# Patient Record
Sex: FEMALE | Race: WHITE | Employment: UNEMPLOYED | URBAN - METROPOLITAN AREA
[De-identification: names, ages, dates, MRNs, and addresses within clinical notes are randomized per-mention and may not be internally consistent; named-entity substitution may affect disease eponyms.]

---

## 2021-03-19 ENCOUNTER — TRANSCRIBE ORDER (OUTPATIENT)
Dept: REGISTRATION | Age: 26
End: 2021-03-19

## 2021-03-19 DIAGNOSIS — Z01.812 PRE-PROCEDURE LAB EXAM: Primary | ICD-10-CM

## 2021-03-24 ENCOUNTER — HOSPITAL ENCOUNTER (OUTPATIENT)
Dept: PREADMISSION TESTING | Age: 26
Discharge: HOME | End: 2021-03-24
Payer: COMMERCIAL

## 2021-03-24 VITALS
HEART RATE: 62 BPM | HEIGHT: 67 IN | BODY MASS INDEX: 21.66 KG/M2 | SYSTOLIC BLOOD PRESSURE: 110 MMHG | DIASTOLIC BLOOD PRESSURE: 70 MMHG | RESPIRATION RATE: 16 BRPM | WEIGHT: 138.01 LBS | TEMPERATURE: 98.1 F | OXYGEN SATURATION: 98 %

## 2021-03-24 LAB
BASOPHILS # BLD: 0.1 K/UL (ref 0–0.1)
BASOPHILS NFR BLD: 1 % (ref 0–1)
DIFFERENTIAL METHOD BLD: NORMAL
EOSINOPHIL # BLD: 0.1 K/UL (ref 0–0.4)
EOSINOPHIL NFR BLD: 2 % (ref 0–7)
ERYTHROCYTE [DISTWIDTH] IN BLOOD BY AUTOMATED COUNT: 11.9 % (ref 11.5–14.5)
HCG UR QL: NEGATIVE
HCT VFR BLD AUTO: 42.1 % (ref 35–47)
HGB BLD-MCNC: 13.6 G/DL (ref 11.5–16)
IMM GRANULOCYTES # BLD AUTO: 0 K/UL (ref 0–0.04)
IMM GRANULOCYTES NFR BLD AUTO: 0 % (ref 0–0.5)
LYMPHOCYTES # BLD: 2.6 K/UL (ref 0.8–3.5)
LYMPHOCYTES NFR BLD: 40 % (ref 12–49)
MCH RBC QN AUTO: 29.8 PG (ref 26–34)
MCHC RBC AUTO-ENTMCNC: 32.3 G/DL (ref 30–36.5)
MCV RBC AUTO: 92.1 FL (ref 80–99)
MONOCYTES # BLD: 0.5 K/UL (ref 0–1)
MONOCYTES NFR BLD: 7 % (ref 5–13)
NEUTS SEG # BLD: 3.3 K/UL (ref 1.8–8)
NEUTS SEG NFR BLD: 50 % (ref 32–75)
NRBC # BLD: 0 K/UL (ref 0–0.01)
NRBC BLD-RTO: 0 PER 100 WBC
PLATELET # BLD AUTO: 353 K/UL (ref 150–400)
PMV BLD AUTO: 10.1 FL (ref 8.9–12.9)
RBC # BLD AUTO: 4.57 M/UL (ref 3.8–5.2)
WBC # BLD AUTO: 6.5 K/UL (ref 3.6–11)

## 2021-03-24 PROCEDURE — 85025 COMPLETE CBC W/AUTO DIFF WBC: CPT

## 2021-03-24 PROCEDURE — 36415 COLL VENOUS BLD VENIPUNCTURE: CPT

## 2021-03-24 PROCEDURE — 81025 URINE PREGNANCY TEST: CPT

## 2021-03-24 RX ORDER — NORETHINDRONE ACETATE AND ETHINYL ESTRADIOL 1MG-20(21)
KIT ORAL
COMMUNITY

## 2021-03-24 RX ORDER — BISMUTH SUBSALICYLATE 262 MG
1 TABLET,CHEWABLE ORAL DAILY
COMMUNITY

## 2021-03-24 NOTE — PERIOP NOTES
PREOP, VERBAL & WRITTEN INSTRUCTIONS GIVEN TO PT. PATIENT GIVEN SURGICAL SITE INFECTION FAQs HANDOUT. PT GIVEN 2 BOTTLES OF CHG SOL WITH VERBAL & WRITTEN INSTRUCTIONS. PT GIVEN OPPORTUNITY TO EXPRESS CONCERNS & ASK QUESTIONS. REQUESTED EKG, NOTES, STRESS TEST FROM DR. PANIAGUA CARDIO AS PT HAS MVP, & HEART MURMUR SINCE AGE 10. SHE CONSULTED DR. Radha Cobos [place] [phone]. DOES NOT SEE CARDIOLOGIST ON REGULAR BASES. PT WAS GIVEN VERBAL 7 WRITTEN INSTRUCTIONS FOR REGISTRATION PROTOCOL DOS, & FOR COVID-19 TESTING.

## 2021-03-28 ENCOUNTER — HOSPITAL ENCOUNTER (OUTPATIENT)
Dept: PREADMISSION TESTING | Age: 26
Discharge: HOME | End: 2021-03-28
Payer: COMMERCIAL

## 2021-03-28 DIAGNOSIS — Z01.812 PRE-PROCEDURE LAB EXAM: ICD-10-CM

## 2021-03-28 PROCEDURE — U0003 INFECTIOUS AGENT DETECTION BY NUCLEIC ACID (DNA OR RNA); SEVERE ACUTE RESPIRATORY SYNDROME CORONAVIRUS 2 (SARS-COV-2) (CORONAVIRUS DISEASE [COVID-19]), AMPLIFIED PROBE TECHNIQUE, MAKING USE OF HIGH THROUGHPUT TECHNOLOGIES AS DESCRIBED BY CMS-2020-01-R: HCPCS

## 2021-03-29 ENCOUNTER — ANESTHESIA EVENT (OUTPATIENT)
Dept: SURGERY | Age: 26
End: 2021-03-29
Payer: COMMERCIAL

## 2021-03-29 LAB — SARS-COV-2, COV2NT: NOT DETECTED

## 2021-04-01 ENCOUNTER — ANESTHESIA (OUTPATIENT)
Dept: SURGERY | Age: 26
End: 2021-04-01
Payer: COMMERCIAL

## 2021-04-01 ENCOUNTER — HOSPITAL ENCOUNTER (OUTPATIENT)
Age: 26
Setting detail: OUTPATIENT SURGERY
Discharge: HOME | End: 2021-04-01
Attending: OBSTETRICS & GYNECOLOGY | Admitting: OBSTETRICS & GYNECOLOGY
Payer: COMMERCIAL

## 2021-04-01 VITALS
WEIGHT: 138 LBS | SYSTOLIC BLOOD PRESSURE: 97 MMHG | HEIGHT: 67 IN | DIASTOLIC BLOOD PRESSURE: 60 MMHG | BODY MASS INDEX: 21.66 KG/M2 | RESPIRATION RATE: 15 BRPM | TEMPERATURE: 98 F | HEART RATE: 61 BPM | OXYGEN SATURATION: 100 %

## 2021-04-01 DIAGNOSIS — G89.18 POSTOPERATIVE PAIN: Primary | ICD-10-CM

## 2021-04-01 LAB — HCG UR QL: NEGATIVE

## 2021-04-01 PROCEDURE — 81025 URINE PREGNANCY TEST: CPT

## 2021-04-01 PROCEDURE — 77030010031 HC SCIS ENDOSC MPLR J&J -C: Performed by: OBSTETRICS & GYNECOLOGY

## 2021-04-01 PROCEDURE — 76210000021 HC REC RM PH II 0.5 TO 1 HR: Performed by: OBSTETRICS & GYNECOLOGY

## 2021-04-01 PROCEDURE — 77030002933 HC SUT MCRYL J&J -A: Performed by: OBSTETRICS & GYNECOLOGY

## 2021-04-01 PROCEDURE — 74011250636 HC RX REV CODE- 250/636: Performed by: NURSE ANESTHETIST, CERTIFIED REGISTERED

## 2021-04-01 PROCEDURE — 76210000006 HC OR PH I REC 0.5 TO 1 HR: Performed by: OBSTETRICS & GYNECOLOGY

## 2021-04-01 PROCEDURE — 2709999900 HC NON-CHARGEABLE SUPPLY: Performed by: OBSTETRICS & GYNECOLOGY

## 2021-04-01 PROCEDURE — 76010000153 HC OR TIME 1.5 TO 2 HR: Performed by: OBSTETRICS & GYNECOLOGY

## 2021-04-01 PROCEDURE — 77030040922 HC BLNKT HYPOTHRM STRY -A

## 2021-04-01 PROCEDURE — 77030020263 HC SOL INJ SOD CL0.9% LFCR 1000ML: Performed by: OBSTETRICS & GYNECOLOGY

## 2021-04-01 PROCEDURE — 74011000250 HC RX REV CODE- 250: Performed by: NURSE ANESTHETIST, CERTIFIED REGISTERED

## 2021-04-01 PROCEDURE — 77030010507 HC ADH SKN DERMBND J&J -B: Performed by: OBSTETRICS & GYNECOLOGY

## 2021-04-01 PROCEDURE — 74011000250 HC RX REV CODE- 250: Performed by: OBSTETRICS & GYNECOLOGY

## 2021-04-01 PROCEDURE — 76060000035 HC ANESTHESIA 2 TO 2.5 HR: Performed by: OBSTETRICS & GYNECOLOGY

## 2021-04-01 PROCEDURE — 77030035029 HC NDL INSUF VERES DISP COVD -B: Performed by: OBSTETRICS & GYNECOLOGY

## 2021-04-01 PROCEDURE — 77030005513 HC CATH URETH FOL11 MDII -B: Performed by: OBSTETRICS & GYNECOLOGY

## 2021-04-01 PROCEDURE — 74011250637 HC RX REV CODE- 250/637: Performed by: ANESTHESIOLOGY

## 2021-04-01 PROCEDURE — 74011250636 HC RX REV CODE- 250/636: Performed by: ANESTHESIOLOGY

## 2021-04-01 PROCEDURE — 77030014090 HC TRCR OPT AMR -B: Performed by: OBSTETRICS & GYNECOLOGY

## 2021-04-01 PROCEDURE — 77030018684: Performed by: OBSTETRICS & GYNECOLOGY

## 2021-04-01 PROCEDURE — 77030008756 HC TU IRR SUC STRY -B: Performed by: OBSTETRICS & GYNECOLOGY

## 2021-04-01 PROCEDURE — 77030008683 HC TU ET CUF COVD -A: Performed by: ANESTHESIOLOGY

## 2021-04-01 PROCEDURE — 88305 TISSUE EXAM BY PATHOLOGIST: CPT

## 2021-04-01 PROCEDURE — 77030013079 HC BLNKT BAIR HGGR 3M -A: Performed by: ANESTHESIOLOGY

## 2021-04-01 RX ORDER — LIDOCAINE HYDROCHLORIDE 10 MG/ML
0.1 INJECTION, SOLUTION EPIDURAL; INFILTRATION; INTRACAUDAL; PERINEURAL AS NEEDED
Status: DISCONTINUED | OUTPATIENT
Start: 2021-04-01 | End: 2021-04-01 | Stop reason: HOSPADM

## 2021-04-01 RX ORDER — FENTANYL CITRATE 50 UG/ML
25 INJECTION, SOLUTION INTRAMUSCULAR; INTRAVENOUS
Status: DISCONTINUED | OUTPATIENT
Start: 2021-04-01 | End: 2021-04-01 | Stop reason: HOSPADM

## 2021-04-01 RX ORDER — ROPIVACAINE HYDROCHLORIDE 5 MG/ML
30 INJECTION, SOLUTION EPIDURAL; INFILTRATION; PERINEURAL AS NEEDED
Status: DISCONTINUED | OUTPATIENT
Start: 2021-04-01 | End: 2021-04-01 | Stop reason: HOSPADM

## 2021-04-01 RX ORDER — OXYCODONE AND ACETAMINOPHEN 5; 325 MG/1; MG/1
1 TABLET ORAL
Qty: 12 TAB | Refills: 0 | Status: SHIPPED | OUTPATIENT
Start: 2021-04-01 | End: 2021-04-04

## 2021-04-01 RX ORDER — FENTANYL CITRATE 50 UG/ML
50 INJECTION, SOLUTION INTRAMUSCULAR; INTRAVENOUS AS NEEDED
Status: DISCONTINUED | OUTPATIENT
Start: 2021-04-01 | End: 2021-04-01 | Stop reason: HOSPADM

## 2021-04-01 RX ORDER — ONDANSETRON 2 MG/ML
4 INJECTION INTRAMUSCULAR; INTRAVENOUS AS NEEDED
Status: DISCONTINUED | OUTPATIENT
Start: 2021-04-01 | End: 2021-04-01 | Stop reason: HOSPADM

## 2021-04-01 RX ORDER — ROCURONIUM BROMIDE 10 MG/ML
INJECTION, SOLUTION INTRAVENOUS AS NEEDED
Status: DISCONTINUED | OUTPATIENT
Start: 2021-04-01 | End: 2021-04-01 | Stop reason: HOSPADM

## 2021-04-01 RX ORDER — KETOROLAC TROMETHAMINE 30 MG/ML
INJECTION, SOLUTION INTRAMUSCULAR; INTRAVENOUS AS NEEDED
Status: DISCONTINUED | OUTPATIENT
Start: 2021-04-01 | End: 2021-04-01 | Stop reason: HOSPADM

## 2021-04-01 RX ORDER — ACETAMINOPHEN 325 MG/1
650 TABLET ORAL ONCE
Status: COMPLETED | OUTPATIENT
Start: 2021-04-01 | End: 2021-04-01

## 2021-04-01 RX ORDER — KETAMINE HYDROCHLORIDE 10 MG/ML
INJECTION, SOLUTION INTRAMUSCULAR; INTRAVENOUS AS NEEDED
Status: DISCONTINUED | OUTPATIENT
Start: 2021-04-01 | End: 2021-04-01 | Stop reason: HOSPADM

## 2021-04-01 RX ORDER — DEXMEDETOMIDINE HYDROCHLORIDE 100 UG/ML
INJECTION, SOLUTION INTRAVENOUS AS NEEDED
Status: DISCONTINUED | OUTPATIENT
Start: 2021-04-01 | End: 2021-04-01 | Stop reason: HOSPADM

## 2021-04-01 RX ORDER — SODIUM CHLORIDE 9 MG/ML
25 INJECTION, SOLUTION INTRAVENOUS CONTINUOUS
Status: DISCONTINUED | OUTPATIENT
Start: 2021-04-01 | End: 2021-04-01 | Stop reason: HOSPADM

## 2021-04-01 RX ORDER — MIDAZOLAM HYDROCHLORIDE 1 MG/ML
0.5 INJECTION, SOLUTION INTRAMUSCULAR; INTRAVENOUS
Status: DISCONTINUED | OUTPATIENT
Start: 2021-04-01 | End: 2021-04-01 | Stop reason: HOSPADM

## 2021-04-01 RX ORDER — PROPOFOL 10 MG/ML
INJECTION, EMULSION INTRAVENOUS AS NEEDED
Status: DISCONTINUED | OUTPATIENT
Start: 2021-04-01 | End: 2021-04-01 | Stop reason: HOSPADM

## 2021-04-01 RX ORDER — IBUPROFEN 600 MG/1
600 TABLET ORAL
Qty: 36 TAB | Refills: 2 | OUTPATIENT
Start: 2021-04-01

## 2021-04-01 RX ORDER — ONDANSETRON 2 MG/ML
INJECTION INTRAMUSCULAR; INTRAVENOUS AS NEEDED
Status: DISCONTINUED | OUTPATIENT
Start: 2021-04-01 | End: 2021-04-01 | Stop reason: HOSPADM

## 2021-04-01 RX ORDER — SODIUM CHLORIDE, SODIUM LACTATE, POTASSIUM CHLORIDE, CALCIUM CHLORIDE 600; 310; 30; 20 MG/100ML; MG/100ML; MG/100ML; MG/100ML
100 INJECTION, SOLUTION INTRAVENOUS CONTINUOUS
Status: DISCONTINUED | OUTPATIENT
Start: 2021-04-01 | End: 2021-04-01 | Stop reason: HOSPADM

## 2021-04-01 RX ORDER — SODIUM CHLORIDE 0.9 % (FLUSH) 0.9 %
5-40 SYRINGE (ML) INJECTION AS NEEDED
Status: DISCONTINUED | OUTPATIENT
Start: 2021-04-01 | End: 2021-04-01 | Stop reason: HOSPADM

## 2021-04-01 RX ORDER — NEOSTIGMINE METHYLSULFATE 1 MG/ML
INJECTION INTRAVENOUS AS NEEDED
Status: DISCONTINUED | OUTPATIENT
Start: 2021-04-01 | End: 2021-04-01 | Stop reason: HOSPADM

## 2021-04-01 RX ORDER — HYDROMORPHONE HYDROCHLORIDE 1 MG/ML
0.2 INJECTION, SOLUTION INTRAMUSCULAR; INTRAVENOUS; SUBCUTANEOUS
Status: DISCONTINUED | OUTPATIENT
Start: 2021-04-01 | End: 2021-04-01 | Stop reason: HOSPADM

## 2021-04-01 RX ORDER — MIDAZOLAM HYDROCHLORIDE 1 MG/ML
1 INJECTION, SOLUTION INTRAMUSCULAR; INTRAVENOUS AS NEEDED
Status: DISCONTINUED | OUTPATIENT
Start: 2021-04-01 | End: 2021-04-01 | Stop reason: HOSPADM

## 2021-04-01 RX ORDER — OXYCODONE HYDROCHLORIDE 5 MG/1
5 TABLET ORAL AS NEEDED
Status: DISCONTINUED | OUTPATIENT
Start: 2021-04-01 | End: 2021-04-01 | Stop reason: HOSPADM

## 2021-04-01 RX ORDER — LIDOCAINE HYDROCHLORIDE 20 MG/ML
INJECTION, SOLUTION EPIDURAL; INFILTRATION; INTRACAUDAL; PERINEURAL AS NEEDED
Status: DISCONTINUED | OUTPATIENT
Start: 2021-04-01 | End: 2021-04-01 | Stop reason: HOSPADM

## 2021-04-01 RX ORDER — DIPHENHYDRAMINE HYDROCHLORIDE 50 MG/ML
12.5 INJECTION, SOLUTION INTRAMUSCULAR; INTRAVENOUS AS NEEDED
Status: DISCONTINUED | OUTPATIENT
Start: 2021-04-01 | End: 2021-04-01 | Stop reason: HOSPADM

## 2021-04-01 RX ORDER — SODIUM CHLORIDE 0.9 % (FLUSH) 0.9 %
5-40 SYRINGE (ML) INJECTION EVERY 8 HOURS
Status: DISCONTINUED | OUTPATIENT
Start: 2021-04-01 | End: 2021-04-01 | Stop reason: HOSPADM

## 2021-04-01 RX ORDER — MORPHINE SULFATE 2 MG/ML
2 INJECTION, SOLUTION INTRAMUSCULAR; INTRAVENOUS
Status: DISCONTINUED | OUTPATIENT
Start: 2021-04-01 | End: 2021-04-01 | Stop reason: HOSPADM

## 2021-04-01 RX ORDER — GLYCOPYRROLATE 0.2 MG/ML
INJECTION INTRAMUSCULAR; INTRAVENOUS AS NEEDED
Status: DISCONTINUED | OUTPATIENT
Start: 2021-04-01 | End: 2021-04-01 | Stop reason: HOSPADM

## 2021-04-01 RX ORDER — FENTANYL CITRATE 50 UG/ML
INJECTION, SOLUTION INTRAMUSCULAR; INTRAVENOUS AS NEEDED
Status: DISCONTINUED | OUTPATIENT
Start: 2021-04-01 | End: 2021-04-01 | Stop reason: HOSPADM

## 2021-04-01 RX ORDER — BUPIVACAINE HYDROCHLORIDE 5 MG/ML
INJECTION, SOLUTION EPIDURAL; INTRACAUDAL AS NEEDED
Status: DISCONTINUED | OUTPATIENT
Start: 2021-04-01 | End: 2021-04-01 | Stop reason: HOSPADM

## 2021-04-01 RX ORDER — DEXAMETHASONE SODIUM PHOSPHATE 4 MG/ML
INJECTION, SOLUTION INTRA-ARTICULAR; INTRALESIONAL; INTRAMUSCULAR; INTRAVENOUS; SOFT TISSUE AS NEEDED
Status: DISCONTINUED | OUTPATIENT
Start: 2021-04-01 | End: 2021-04-01 | Stop reason: HOSPADM

## 2021-04-01 RX ORDER — SODIUM CHLORIDE, SODIUM LACTATE, POTASSIUM CHLORIDE, CALCIUM CHLORIDE 600; 310; 30; 20 MG/100ML; MG/100ML; MG/100ML; MG/100ML
25 INJECTION, SOLUTION INTRAVENOUS CONTINUOUS
Status: DISCONTINUED | OUTPATIENT
Start: 2021-04-01 | End: 2021-04-01 | Stop reason: HOSPADM

## 2021-04-01 RX ADMIN — ONDANSETRON HYDROCHLORIDE 4 MG: 2 INJECTION, SOLUTION INTRAMUSCULAR; INTRAVENOUS at 11:50

## 2021-04-01 RX ADMIN — LIDOCAINE HYDROCHLORIDE 40 MG: 20 INJECTION, SOLUTION EPIDURAL; INFILTRATION; INTRACAUDAL; PERINEURAL at 11:45

## 2021-04-01 RX ADMIN — KETAMINE HYDROCHLORIDE 30 MG: 10 INJECTION, SOLUTION INTRAMUSCULAR; INTRAVENOUS at 11:45

## 2021-04-01 RX ADMIN — GLYCOPYRROLATE 0.4 MG: 0.2 INJECTION, SOLUTION INTRAMUSCULAR; INTRAVENOUS at 13:07

## 2021-04-01 RX ADMIN — ROCURONIUM BROMIDE 30 MG: 10 SOLUTION INTRAVENOUS at 11:45

## 2021-04-01 RX ADMIN — NEOSTIGMINE METHYLSULFATE 3 MG: 1 INJECTION, SOLUTION INTRAVENOUS at 13:07

## 2021-04-01 RX ADMIN — ACETAMINOPHEN 650 MG: 325 TABLET ORAL at 09:51

## 2021-04-01 RX ADMIN — FENTANYL CITRATE 50 MCG: 50 INJECTION, SOLUTION INTRAMUSCULAR; INTRAVENOUS at 11:49

## 2021-04-01 RX ADMIN — PROPOFOL 200 MG: 10 INJECTION, EMULSION INTRAVENOUS at 11:45

## 2021-04-01 RX ADMIN — KETOROLAC TROMETHAMINE 30 MG: 30 INJECTION, SOLUTION INTRAMUSCULAR; INTRAVENOUS at 13:10

## 2021-04-01 RX ADMIN — DEXAMETHASONE SODIUM PHOSPHATE 4 MG: 4 INJECTION, SOLUTION INTRAMUSCULAR; INTRAVENOUS at 11:50

## 2021-04-01 RX ADMIN — ONDANSETRON HYDROCHLORIDE 4 MG: 2 INJECTION, SOLUTION INTRAMUSCULAR; INTRAVENOUS at 13:04

## 2021-04-01 RX ADMIN — SODIUM CHLORIDE, POTASSIUM CHLORIDE, SODIUM LACTATE AND CALCIUM CHLORIDE 25 ML/HR: 600; 310; 30; 20 INJECTION, SOLUTION INTRAVENOUS at 10:12

## 2021-04-01 RX ADMIN — PHENYLEPHRINE HYDROCHLORIDE 40 MCG: 10 INJECTION INTRAVENOUS at 12:25

## 2021-04-01 RX ADMIN — FENTANYL CITRATE 50 MCG: 50 INJECTION, SOLUTION INTRAMUSCULAR; INTRAVENOUS at 12:07

## 2021-04-01 RX ADMIN — ONDANSETRON 4 MG: 2 INJECTION INTRAMUSCULAR; INTRAVENOUS at 14:29

## 2021-04-01 RX ADMIN — DEXMEDETOMIDINE HYDROCHLORIDE 10 MCG: 100 INJECTION, SOLUTION, CONCENTRATE INTRAVENOUS at 11:49

## 2021-04-01 NOTE — DISCHARGE INSTRUCTIONS
After Care Instructions For Your Laparoscopy      1. You may resume your usual diet once the nausea resolves. Initially, try sips of warm fluids and a bland diet. 2. Avoid heavy lifting or straining. Gradually increase your activity. First try walking and doing light activity around the house. You may resume your normal habits if no significant discomfort or bleeding develops. Most women can return to work within 2-7 days after this procedure. 3. Sexual intercourse can be resumed as soon as desired provided the discomfort following surgery has subsided. 4. You may take showers or tub baths. It is also safe to swim or use hot tubs once you feel up to it. 5. Some lower abdominal cramping typically occurs after this procedure. Tylenol, Motrin or your prescribed pain medication should relieve this discomfort. You should notice steady improvement in the pain over the next day or so. It is also not unusual to experience some discomfort under your ribs or to notice neck or shoulder soreness. This is due to gas used during the surgery to help the physicians see your pelvic organs. The gas is reabsorbed over a 24 hour course. 6. It is not unusual to have vaginal spotting lasting 2-3 days. It is difficult to predict when your next menstrual period will occur as your body has undergone a major stress. Your period should regulate itself within the first 6 weeks post-op. 7. A bruise may appear around the incision and will gradually resolve as it heals. 8. The suture used to close the incision may be visible above the skin. If so, it will be removed at your office visit. However, frequently sutures are placed below the skin and will reabsorb on their own.   There will be no need for removal.     9. Call the office at [phone] to report any of the following problems: Abdominal pain that is increasing in severity, heavy vaginal bleeding, drainage or separation of your incision site, temperature greater than 100.4 or persistent nausea and vomiting. 10. You should be seen in the office following your surgery. Call for an appointment if this has not already been arranged. At this appointment, the findings noted at the time of your procedure will be discussed. 11. You may remove the dressing from your incision after 12 hours. {Medication reconciliation information is now added to the patient's AVS automatically when it is printed. There is no need to use this SmartLink in discharge instructions. Highlight this text and delete it to clear this message}    ______________________________________________________________________    Anesthesia Discharge Instructions    After general anesthesia or intervenous sedation, for 24 hours or while taking prescription Narcotics:  · Limit your activities  · Do not drive or operate hazardous machinery  · If you have not urinated within 8 hours after discharge, please contact your surgeon on call. · Do not make important personal or business decisions  · Do not drink alcoholic beverages    Report the following to your surgeon:  · Excessive pain, swelling, redness or odor of or around the surgical area  · Temperature over 100.5 degrees  · Nausea and vomiting lasting longer than 4 hours or if unable to take medication  · Any signs of decreased circulation or nerve impairment to extremity:  Change in color, persistent numbness, tingling, coldness or increased pain.   · Any questions    **Last dose of Tylenol (650mg) given at 09:51am**

## 2021-04-01 NOTE — H&P
History and Physical    Patient: Annika Fermin MRN: [id number]  SSN: [id number]    YOB: 1995  Age: 27 y.o. Sex: female      Subjective:      Annika Fermin is a 27 y.o. female presents for surgical evaluation of chronic pelvic pain and dyspareunia. Patient has some improvement in symptoms while using continuous oral contraception. Patient denies changes or complaints this am.     Past Medical History:   Diagnosis Date    Arrhythmia     HEART MURMUR GENETIC, DIAGNOSED WHEN PT WAS 10 YRS OLD. MITRAL VALVE PROLAPSE. RECENTLY SAW DR. Pedro Liao    Chronic pain 2021    LOWER ABDOMINAL PAIN. Past Surgical History:   Procedure Laterality Date    HX HEENT      TONSILLECTOMY-AGE 10    HX HEENT      EXTRACTION OF WISDOM TEETH X 4      Family History   Problem Relation Age of Onset    Diabetes Mother         GESTATIONAL DIABETES.  Other Father         HEART MURMUR WITH MITRAL VALVE PROLAPSE    Other Paternal Grandmother         HEART MURMUR WITH MITRAL VALVE PROLAPS     Social History     Tobacco Use    Smoking status: Former Smoker     Packs/day: 1.00     Years: 6.00     Pack years: 6.00     Quit date: 3/20/2020     Years since quittin.0    Smokeless tobacco: Never Used   Substance Use Topics    Alcohol use: Yes     Comment: RARELY      Prior to Admission medications    Medication Sig Start Date End Date Taking? Authorizing Provider   norethindrone-ethinyl estradiol (Junel FE 1/20, ,) 1 mg-20 mcg (21)/75 mg (7) tab Take  by mouth nightly. TAKES AT 10 PM    Provider, Historical   multivitamin (ONE A DAY) tablet Take 1 Tab by mouth daily. Provider, Historical        No Known Allergies    Review of Systems:  A comprehensive review of systems was negative except for that written in the History of Present Illness. Objective: There were no vitals filed for this visit.      Physical Exam:  GENERAL: alert, cooperative, no distress, appears stated age  LUNG: clear to auscultation bilaterally  HEART: regular rate and rhythm  ABDOMEN: soft, non-tender. EXTREMITIES:  extremities normal, atraumatic, no cyanosis or edema    No results found for this or any previous visit (from the past 12 hour(s)). Assessment and Plan:     28 yo presents for diagnostic laparoscopy for chronic pelvic pain and dyspareunia.    Consents signed  Risks reviewed including bleeding, infection, open procedure, and surgical injury   Proceed to OR       Signed By: Rose Amaro MD     April 1, 2021

## 2021-04-01 NOTE — OP NOTES
Gynecologic Laparoscopy Procedure Note    Patient ID:     Name: Melissa Beckett    Medical Record Number: [id number]    YOB: 1995    April 1, 2021      Preoperative Diagnosis:   PELVIC PAIN    Postoperative Diagnosis: PELVIC PAIN    Surgeon: Priscilla Ureña MD    Assistant: Dino Loya MD     Anesthesia: General    Procedure: Diagnostic laparoscopy and excision of endometriosis     Estimated Blood Loss: 5ml     Pathology /Specimens: Left ovarian fossa biopsy , right anterior broad ligament biopsy , anterior peritoneum biopsy     Complications: none     Findings: multiple areas of endometriosis implants including the Left ovarian fossa, left pelvic side wall atop uterer, right pelvic side wall along uterer. Cul de sac atop rectum , Left uterine artery, and right anterior broad ligament ,left fimbria and ovary. No adhesions were noted. Staging consistent with stage II. Uterus, fallopian tubes, and ovaries otherwise normal in appearance      Signed By: Priscilla Ureña MD      Procedure in Detail:  The patient was taken to the operating room where she was placed in the supine position. After undergoing adequate general endotracheal anesthesia, she was placed up in the [facility] laparoscopy stirru in the dorsal lithotomy position. The patient was then prepped and draped in the usual fashion and howell catheter was placed into the bladder. Attention was first turned to the vagina where the speculum was placed in the vagina. The anterior lip of the cervix was grasped with a single-toothed tenaculum. Hulka tenaculum was placed without difficulty. All other instruments were removed from the vagina and 's gloves were changed. Attention was then turned to the abdomen. A vertical incision was made in the umbilicus. The Veress needle was introduced but given saline test did not insuflate. The decision made to enter under direct visualization.   5mm  trocar was place at the umbilical port and Z21 pnuemo-peritoneum was created without difficulty. There was no evidence of bowel injury nor bleeding. 3 accessory ports was placed, two  in the right and one  left  lower quadrants in the same fashion under direct visualization and without apparent injury. The above findings were noted. The following was performed. The area of endometriosis along the left ovarian fossa was elevated and resected using endoshears. The area along the right aterior broad ligament was elevated and resected using endoshears. The anterior peritoneal biopsy implant was elevated and biopsies. Given the location of the further implants the decision was made to not resect them. All instruments were removed and CO2 gas was diffused. There was good hemostasis. Skin incisions were closed with 4.0 Vicryl in a subcuticular stitch  on the skin. All instruments were removed from the vagina. The patient was awakened, extubated and taken to the recovery room in good condition.     Signed By: Elizabeth Hatfield MD

## 2021-04-01 NOTE — ANESTHESIA PREPROCEDURE EVALUATION
Relevant Problems   No relevant active problems       Anesthetic History   No history of anesthetic complications            Review of Systems / Medical History  Patient summary reviewed, nursing notes reviewed and pertinent labs reviewed    Pulmonary  Within defined limits                 Neuro/Psych   Within defined limits           Cardiovascular            Dysrhythmias : PVC      Exercise tolerance: >4 METS  Comments: Mitral valve prolapse   GI/Hepatic/Renal  Within defined limits              Endo/Other  Within defined limits           Other Findings   Comments: Recovering drug addict- no use for years           Physical Exam    Airway  Mallampati: II  TM Distance: 4 - 6 cm  Neck ROM: normal range of motion   Mouth opening: Normal     Cardiovascular  Regular rate and rhythm,  S1 and S2 normal,  no murmur, click, rub, or gallop             Dental  No notable dental hx       Pulmonary  Breath sounds clear to auscultation               Abdominal  GI exam deferred       Other Findings            Anesthetic Plan    ASA: 2  Anesthesia type: general          Induction: Intravenous  Anesthetic plan and risks discussed with: Patient

## 2021-04-01 NOTE — ROUTINE PROCESS
Patient: Adrian Section MRN: [id number]  SSN: [id number] YOB: 1995  Age: 27 y.o. Sex: female Patient is status post Procedure(s): DIAGNOSTIC LAPAROSCOPY AND EXCISION OF ENDOMETRIOSIS. Surgeon(s) and Role: * James Magana MD - Primary * Guillermina Landeros MD - Assisting Local/Dose/Irrigation: 20Ml Peripheral IV 04/01/21 Left Forearm (Active) Site Assessment Clean, dry, & intact 04/01/21 1009 Phlebitis Assessment 0 04/01/21 1009 Infiltration Assessment 0 04/01/21 1009 Dressing Status Clean, dry, & intact 04/01/21 1009 Dressing Type Tape;Transparent 04/01/21 1009 Hub Color/Line Status Blue; Infusing 04/01/21 1009 Dressing/Packing:  Incision 04/01/21 Abdomen-Dressing/Treatment: Skin glue (04/01/21 1308) Incision 04/01/21 Perineum-Dressing/Treatment: Peripad (04/01/21 1308) Splint/Cast:  ] Other:

## 2021-04-01 NOTE — PERIOP NOTES
I have reviewed discharge instructions with the patient and boyfriend. The patient and boyfriend verbalized understanding.

## 2021-04-02 NOTE — ANESTHESIA POSTPROCEDURE EVALUATION
Post-Anesthesia Evaluation and Assessment    Patient: Annika Fermin MRN: [id number]  SSN: [id number]    YOB: 1995  Age: 27 y.o. Sex: female      I have evaluated the patient and they are stable and ready for discharge from the PACU. Cardiovascular Function/Vital Signs  Visit Vitals  BP 97/60 (BP 1 Location: Right upper arm, BP Patient Position: At rest)   Pulse 61   Temp 36.7 °C (98 °F)   Resp 15   Ht 5' 7\" (1.702 m)   Wt 62.6 kg (138 lb)   SpO2 100%   BMI 21.61 kg/m²       Patient is status post General anesthesia for Procedure(s):  DIAGNOSTIC LAPAROSCOPY AND EXCISION OF ENDOMETRIOSIS. Nausea/Vomiting: None    Postoperative hydration reviewed and adequate. Pain:  Pain Scale 1: Numeric (0 - 10) (04/01/21 1445)  Pain Intensity 1: 0 (04/01/21 1445)   Managed    Neurological Status:   Neuro (WDL): Within Defined Limits (04/01/21 1445)   At baseline    Mental Status, Level of Consciousness: Alert and  oriented to person, place, and time    Pulmonary Status:   O2 Device: Room air (04/01/21 1445)   Adequate oxygenation and airway patent    Complications related to anesthesia: None    Post-anesthesia assessment completed. No concerns    Signed By: Meli Soriano MD     April 2, 2021              Procedure(s):  DIAGNOSTIC LAPAROSCOPY AND EXCISION OF ENDOMETRIOSIS.     general    <BSHSIANPOST>    INITIAL Post-op Vital signs:   Vitals Value Taken Time   BP 97/61 04/01/21 1415   Temp 36.8 °C (98.2 °F) 04/01/21 1337   Pulse 72 04/01/21 1415   Resp 16 04/01/21 1415   SpO2 99 % 04/01/21 1415

## 2021-07-30 RX ORDER — ONDANSETRON 4 MG/1
4 TABLET, ORALLY DISINTEGRATING ORAL
COMMUNITY

## 2021-08-06 ENCOUNTER — ANESTHESIA (OUTPATIENT)
Dept: ENDOSCOPY | Age: 26
End: 2021-08-06
Payer: COMMERCIAL

## 2021-08-06 ENCOUNTER — HOSPITAL ENCOUNTER (OUTPATIENT)
Age: 26
Setting detail: OUTPATIENT SURGERY
Discharge: HOME | End: 2021-08-06
Attending: INTERNAL MEDICINE | Admitting: INTERNAL MEDICINE
Payer: COMMERCIAL

## 2021-08-06 ENCOUNTER — ANESTHESIA EVENT (OUTPATIENT)
Dept: ENDOSCOPY | Age: 26
End: 2021-08-06
Payer: COMMERCIAL

## 2021-08-06 VITALS
RESPIRATION RATE: 18 BRPM | BODY MASS INDEX: 22.04 KG/M2 | TEMPERATURE: 98.8 F | HEIGHT: 67 IN | DIASTOLIC BLOOD PRESSURE: 62 MMHG | WEIGHT: 140.43 LBS | OXYGEN SATURATION: 99 % | SYSTOLIC BLOOD PRESSURE: 116 MMHG | HEART RATE: 79 BPM

## 2021-08-06 LAB — HCG UR QL: NEGATIVE

## 2021-08-06 PROCEDURE — 74011250636 HC RX REV CODE- 250/636: Performed by: NURSE ANESTHETIST, CERTIFIED REGISTERED

## 2021-08-06 PROCEDURE — 88305 TISSUE EXAM BY PATHOLOGIST: CPT

## 2021-08-06 PROCEDURE — 76060000031 HC ANESTHESIA FIRST 0.5 HR: Performed by: INTERNAL MEDICINE

## 2021-08-06 PROCEDURE — 76040000019: Performed by: INTERNAL MEDICINE

## 2021-08-06 PROCEDURE — 81025 URINE PREGNANCY TEST: CPT

## 2021-08-06 PROCEDURE — 74011250636 HC RX REV CODE- 250/636: Performed by: INTERNAL MEDICINE

## 2021-08-06 PROCEDURE — 2709999900 HC NON-CHARGEABLE SUPPLY: Performed by: INTERNAL MEDICINE

## 2021-08-06 PROCEDURE — 74011000250 HC RX REV CODE- 250: Performed by: NURSE ANESTHETIST, CERTIFIED REGISTERED

## 2021-08-06 PROCEDURE — 77030021593 HC FCPS BIOP ENDOSC BSC -A: Performed by: INTERNAL MEDICINE

## 2021-08-06 RX ORDER — MIDAZOLAM HYDROCHLORIDE 1 MG/ML
.25-5 INJECTION, SOLUTION INTRAMUSCULAR; INTRAVENOUS
Status: DISCONTINUED | OUTPATIENT
Start: 2021-08-06 | End: 2021-08-06 | Stop reason: HOSPADM

## 2021-08-06 RX ORDER — SODIUM CHLORIDE 9 MG/ML
50 INJECTION, SOLUTION INTRAVENOUS CONTINUOUS
Status: DISCONTINUED | OUTPATIENT
Start: 2021-08-06 | End: 2021-08-06 | Stop reason: HOSPADM

## 2021-08-06 RX ORDER — NALOXONE HYDROCHLORIDE 0.4 MG/ML
0.4 INJECTION, SOLUTION INTRAMUSCULAR; INTRAVENOUS; SUBCUTANEOUS
Status: DISCONTINUED | OUTPATIENT
Start: 2021-08-06 | End: 2021-08-06 | Stop reason: HOSPADM

## 2021-08-06 RX ORDER — LIDOCAINE HYDROCHLORIDE 20 MG/ML
INJECTION, SOLUTION EPIDURAL; INFILTRATION; INTRACAUDAL; PERINEURAL AS NEEDED
Status: DISCONTINUED | OUTPATIENT
Start: 2021-08-06 | End: 2021-08-06 | Stop reason: HOSPADM

## 2021-08-06 RX ORDER — PANTOPRAZOLE SODIUM 20 MG/1
20 TABLET, DELAYED RELEASE ORAL DAILY
Qty: 90 TABLET | Refills: 0 | Status: SHIPPED | OUTPATIENT
Start: 2021-08-06 | End: 2022-06-01 | Stop reason: ALTCHOICE

## 2021-08-06 RX ORDER — DEXTROMETHORPHAN/PSEUDOEPHED 2.5-7.5/.8
1.2 DROPS ORAL
Status: DISCONTINUED | OUTPATIENT
Start: 2021-08-06 | End: 2021-08-06 | Stop reason: HOSPADM

## 2021-08-06 RX ORDER — PROPOFOL 10 MG/ML
INJECTION, EMULSION INTRAVENOUS AS NEEDED
Status: DISCONTINUED | OUTPATIENT
Start: 2021-08-06 | End: 2021-08-06 | Stop reason: HOSPADM

## 2021-08-06 RX ORDER — PANTOPRAZOLE SODIUM 20 MG/1
20 TABLET, DELAYED RELEASE ORAL
COMMUNITY
End: 2022-06-01 | Stop reason: ALTCHOICE

## 2021-08-06 RX ORDER — FENTANYL CITRATE 50 UG/ML
INJECTION, SOLUTION INTRAMUSCULAR; INTRAVENOUS AS NEEDED
Status: DISCONTINUED | OUTPATIENT
Start: 2021-08-06 | End: 2021-08-06 | Stop reason: HOSPADM

## 2021-08-06 RX ORDER — EPINEPHRINE 0.1 MG/ML
1 INJECTION INTRACARDIAC; INTRAVENOUS
Status: DISCONTINUED | OUTPATIENT
Start: 2021-08-06 | End: 2021-08-06 | Stop reason: HOSPADM

## 2021-08-06 RX ORDER — PROPOFOL 10 MG/ML
INJECTION, EMULSION INTRAVENOUS
Status: DISCONTINUED | OUTPATIENT
Start: 2021-08-06 | End: 2021-08-06 | Stop reason: HOSPADM

## 2021-08-06 RX ORDER — ATROPINE SULFATE 0.1 MG/ML
0.4 INJECTION INTRAVENOUS
Status: DISCONTINUED | OUTPATIENT
Start: 2021-08-06 | End: 2021-08-06 | Stop reason: HOSPADM

## 2021-08-06 RX ORDER — FLUMAZENIL 0.1 MG/ML
0.2 INJECTION INTRAVENOUS
Status: DISCONTINUED | OUTPATIENT
Start: 2021-08-06 | End: 2021-08-06 | Stop reason: HOSPADM

## 2021-08-06 RX ORDER — SODIUM CHLORIDE 9 MG/ML
INJECTION, SOLUTION INTRAVENOUS
Status: DISCONTINUED | OUTPATIENT
Start: 2021-08-06 | End: 2021-08-06 | Stop reason: HOSPADM

## 2021-08-06 RX ADMIN — SODIUM CHLORIDE: 9 INJECTION, SOLUTION INTRAVENOUS at 14:15

## 2021-08-06 RX ADMIN — PROPOFOL INJECTABLE EMULSION 30 MG: 10 INJECTION, EMULSION INTRAVENOUS at 14:46

## 2021-08-06 RX ADMIN — FENTANYL CITRATE 50 MCG: 50 INJECTION, SOLUTION INTRAMUSCULAR; INTRAVENOUS at 14:43

## 2021-08-06 RX ADMIN — FENTANYL CITRATE 50 MCG: 50 INJECTION, SOLUTION INTRAMUSCULAR; INTRAVENOUS at 14:40

## 2021-08-06 RX ADMIN — SODIUM CHLORIDE 50 ML/HR: 9 INJECTION, SOLUTION INTRAVENOUS at 13:45

## 2021-08-06 RX ADMIN — LIDOCAINE HYDROCHLORIDE 60 MG: 20 INJECTION, SOLUTION INTRAVENOUS at 14:42

## 2021-08-06 RX ADMIN — PROPOFOL INJECTABLE EMULSION 50 MG: 10 INJECTION, EMULSION INTRAVENOUS at 14:42

## 2021-08-06 RX ADMIN — PROPOFOL INJECTABLE EMULSION 50 MG: 10 INJECTION, EMULSION INTRAVENOUS at 14:43

## 2021-08-06 RX ADMIN — PROPOFOL INJECTABLE EMULSION 20 MG: 10 INJECTION, EMULSION INTRAVENOUS at 14:44

## 2021-08-06 RX ADMIN — PROPOFOL 150 MCG/KG/MIN: 10 INJECTION, EMULSION INTRAVENOUS at 14:42

## 2021-08-06 RX ADMIN — PROPOFOL INJECTABLE EMULSION 50 MG: 10 INJECTION, EMULSION INTRAVENOUS at 14:45

## 2021-08-06 NOTE — ANESTHESIA POSTPROCEDURE EVALUATION
Procedure(s):  ESOPHAGOGASTRODUODENOSCOPY (EGD)  ESOPHAGOGASTRODUODENAL (EGD) BIOPSY. MAC    Anesthesia Post Evaluation      Multimodal analgesia: multimodal analgesia used between 6 hours prior to anesthesia start to PACU discharge  Patient location during evaluation: PACU  Patient participation: complete - patient participated  Level of consciousness: awake and alert  Pain management: adequate  Airway patency: patent  Anesthetic complications: no  Cardiovascular status: acceptable  Respiratory status: acceptable  Hydration status: acceptable  Post anesthesia nausea and vomiting:  none  Final Post Anesthesia Temperature Assessment:  Normothermia (36.0-37.5 degrees C)      INITIAL Post-op Vital signs:   Vitals Value Taken Time   /62 08/06/21 1512   Temp 37.1 °C (98.8 °F) 08/06/21 1452   Pulse 80 08/06/21 1515   Resp 17 08/06/21 1515   SpO2 99 % 08/06/21 1514   Vitals shown include unvalidated device data.

## 2021-08-06 NOTE — PROGRESS NOTES
Jamey Hightower  1995  [id number]    Situation:  Verbal report received from: David Urbina RN  Procedure: Procedure(s):  ESOPHAGOGASTRODUODENOSCOPY (EGD)  ESOPHAGOGASTRODUODENAL (EGD) BIOPSY    Background:    Preoperative diagnosis: Chronic nausea [R11.0]  Postoperative diagnosis: Normal EGD. :  Dr. Kavin Wang  Assistant(s): Endoscopy RN-1: Liz Paget, RN  Endoscopy RN-2: Aldo Fajardo RN    Specimens:   ID Type Source Tests Collected by Time Destination   1 : duodenum biopsy Preservative Duodenum  Rayna Quiroz MD 8/6/2021 1446 Pathology   2 : gastric biopsy Preservative Gastric  Rayna Quiroz MD 8/6/2021 1446 Pathology     H. Pylori  no    Assessment:  Intra-procedure medications   Anesthesia gave intra-procedure sedation and medications, see anesthesia flow sheet yes    Intravenous fluids: NS@ KVO     Vital signs stable yes    Abdominal assessment: round and soft yes    Recommendation:  Discharge patient per MD order yes.   Family or Friend daniel Dillon  Permission to share finding with family or friend yes

## 2021-08-06 NOTE — DISCHARGE INSTRUCTIONS
Chepe Gan M.D.  [phone]           2021  Deion Dale  :  1995  [facility] Medical Record Number:  [id number]        ENDOSCOPY FINDINGS:   Your endoscopy showed normal exam.      EGD DISCHARGE INSTRUCTIONS    DISCOMFORT:  Sore throat- throat lozenges or warm salt water gargle  redness at IV site- apply warm compress to area; if redness or soreness persist- contact your physician  Gaseous discomfort- walking, belching will help relieve any discomfort  You may not operate a vehicle for 12 hours  You may not engage in an occupation involving machinery or appliances for rest of today  You may not drink alcoholic beverages for at least 12 hours  Avoid making any critical decisions for at least 24 hour    DIET:   You may resume your regular diet. ACTIVITY  Spend the remainder of the day resting -  avoid any strenuous activity. Avoid driving or operating machinery. CALL M.D. ANY SIGN OF   Increasing pain, nausea, vomiting  Abdominal distension (swelling)  New increased bleeding (oral or rectal)  Fever (chills)  Pain in chest area  Bloody discharge from nose or mouth  Shortness of breath    Follow-up Instructions:   Call Dr. Toñito Silveira for any questions or problems. Telephone # [phone]  If biopsies were obtained, the results will be available  in  5 to 7 days. We will call you to notify you of these results. Continue same medications. Follow up in the office.

## 2021-08-06 NOTE — PERIOP NOTES
1440  Anesthesia staff at patient's bedside administering anesthesia and monitoring patients vital signs throughout procedure. See anesthesia note. Post procedure, report received from Veronica HONG. 1442  Endoscope was pre-cleaned at bedside immediately following procedure by endo Freda dominguez RN.     2384  Patient tolerated procedure. Abdomen soft and patient arousable and voices no complaints. Patient transported to endoscopy recovery area. Report given to post procedure Darell GUZMAN.

## 2021-08-06 NOTE — PROCEDURES
Angelica Capellan M.D.  [phone]           2021                EGD Operative Report  Samuel Lombardo  :  1995  [address] [place] [address] Medical Record Number:  [id number]      Indication: chronic nausea     : Mishel Jaramillo MD    Assistant -- None  Implants -- None    Referring Provider:  None      Anesthesia/Sedation:  MAC anesthesia Propofol    Airway assessment: No airway problems anticipated    Pre-Procedural Exam:      Airway: clear, no airway problems anticipated  Heart: RRR, without gallops or rubs  Lungs: clear bilaterally without wheezes, crackles, or rhonchi  Abdomen: soft, nontender, nondistended, bowel sounds present  Mental Status: awake, alert and oriented to person, place and time       Procedure Details     After infomed consent was obtained for the procedure, with all risks and benefits of procedure explained the patient was taken to the endoscopy suite and placed in the left lateral decubitus position. Following sequential administration of sedation as per above, the endoscope was inserted into the mouth and advanced under direct vision to second portion of the duodenum. A careful inspection was made as the gastroscope was withdrawn, including a retroflexed view of the proximal stomach; findings and interventions are described below. Findings:   Esophagus:normal  Stomach: normal   Duodenum/jejunum: normal    Therapies:  biopsy of stomach - r/o H.pylori infection  biopsy of duodenal - r/o celiac disease    Specimens: as above           Complications:   None; patient tolerated the procedure well. EBL:  None. Impression:    Normal EGD     Recommendations:    -Continue acid suppression. ,   -Await pathology.     Mishel Jaramillo MD

## 2021-08-06 NOTE — PROGRESS NOTES
Endoscopy discharge instructions have been reviewed and given to patient. The patient verbalized understanding and acceptance of instructions. Dr. Tania Barker discussed with patient and margoancee procedure findings and next steps.

## 2021-08-06 NOTE — H&P
Elias Del Rio M.D.  [phone]            History and Physical       NAME:  Kari Garcias   :   1995   MRN:   [id number]       Referring Physician:  None      Consult Date: 2021 2:31 PM    Chief Complaint: nausea     History of Present Illness:   Patient is a 28 yo female who presents today for evaluation of nausea. Today, she complains of nausea that has been ongoing off and on for several years. Had ex lap w/ excision of endometriosis at [facility] on . Since then, she has had nausea daily. She was taking percocet for four days after procedure. She states her nausea is typically worse in the evening before bed. No vomiting at all. Sometimes eating will make nausea better and sometimes worse. Occasionally acid reflux. She states she had an episode of severe epigastric pain in the past and someone said she may have an ulcer and gave her acid reflux medication - symptoms improved with medication. She states she did nausea with that episode of pain as well. She has been having BMs regularly - occasionally with harder stools but this is not often. She states gynecologist told her she has endometriosis on her colon. Has never had an endoscopy. Was taking ibuprofen often last year but not recently. 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 MITRAL VALVE PROLAPSE. RECENTLY SAW DR. Cheyenne Carlson    CAD (coronary artery disease)     Chronic pain 2021    LOWER ABDOMINAL PAIN.    GERD (gastroesophageal reflux disease)     Ill-defined condition     mitral valve prolapse    Mitral valve prolapse     Nausea & vomiting        PSH:  Past Surgical History:   Procedure Laterality Date    HX GYN      laproscopic surgery for endometriosis    HX HEENT      TONSILLECTOMY-AGE 10    HX HEENT      EXTRACTION OF WISDOM TEETH X 4       Allergies:   Allergies   Allergen Reactions  Chlorhexidine Hives       Home Medications:  Prior to Admission Medications   Prescriptions Last Dose Informant Patient Reported? Taking?   ibuprofen (MOTRIN) 600 mg tablet 2021 at Unknown time  No Yes   Sig: Take 1 Tab by mouth every eight (8) hours as needed for Pain.   multivitamin (ONE A DAY) tablet 8/3/2021  Yes No   Sig: Take 1 Tab by mouth daily. norethindrone-ethinyl estradiol (Junel FE 1/20, ,) 1 mg-20 mcg (21)/75 mg (7) tab 2021 at Unknown time  Yes Yes   Sig: Take  by mouth nightly. TAKES AT 10 PM   ondansetron (ZOFRAN ODT) 4 mg disintegrating tablet 2021 at Unknown time  Yes Yes   Sig: Take 4 mg by mouth every eight (8) hours as needed for Nausea or Vomiting. pantoprazole (PROTONIX) 20 mg tablet 2021 at Unknown time  Yes Yes   Sig: Take 20 mg by mouth Daily (before breakfast). Indications: gastroesophageal reflux disease      Facility-Administered Medications: None       Hospital Medications:  No current facility-administered medications for this encounter. Facility-Administered Medications Ordered in Other Encounters   Medication Dose Route Frequency    0.9% sodium chloride infusion   IntraVENous CONTINUOUS       Social History:  Social History     Tobacco Use    Smoking status: Former Smoker     Packs/day: 1.00     Years: 6.00     Pack years: 6.00     Quit date: 3/20/2020     Years since quittin.3    Smokeless tobacco: Never Used   Substance Use Topics    Alcohol use: Yes     Comment: RARELY       Family History:  Family History   Problem Relation Age of Onset    Diabetes Mother         GESTATIONAL DIABETES.     Other Father         HEART MURMUR WITH MITRAL VALVE PROLAPSE    Other Paternal Grandmother         HEART MURMUR WITH MITRAL VALVE PROLAPS             Review of Systems:      Constitutional: negative fever, negative chills, negative weight loss  Eyes:   negative visual changes  ENT:   negative sore throat, tongue or lip swelling  Respiratory:  negative cough, negative dyspnea  Cards:  negative for chest pain, palpitations, lower extremity edema  GI:   See HPI  :  negative for frequency, dysuria  Integument:  negative for rash and pruritus  Heme:  negative for easy bruising and gum/nose bleeding  Musculoskel: negative for myalgias,  back pain and muscle weakness  Neuro: negative for headaches, dizziness, vertigo  Psych:  negative for feelings of anxiety, depression       Objective:     Patient Vitals for the past 8 hrs:   BP Temp Pulse Resp SpO2 Height Weight   08/06/21 1313 122/67 98.2 °F (36.8 °C) 87 17 99 % 5' 7\" (1.702 m) 63.7 kg (140 lb 6.9 oz)     No intake/output data recorded. No intake/output data recorded. EXAM:     NEURO-a&o   HEENT-wnl   LUNGS-clear    COR-regular rate and rhythym     ABD-soft , no tenderness, no rebound, good bs     EXT-no edema     Data Review     No results for input(s): WBC, HGB, HCT, PLT, HGBEXT, HCTEXT, PLTEXT in the last 72 hours. No results for input(s): NA, K, CL, CO2, BUN, CREA, GLU, PHOS, CA in the last 72 hours. No results for input(s): AP, TBIL, TP, ALB, GLOB, GGT, AML, LPSE in the last 72 hours. No lab exists for component: SGOT, GPT, AMYP, HLPSE  No results for input(s): INR, PTP, APTT, INREXT in the last 72 hours. There is no problem list on file for this patient. Assessment:   · nausea   Plan:   · EGD  today.      Signed By: Angeline Lea MD     8/6/2021  2:31 PM

## 2021-08-06 NOTE — ANESTHESIA PREPROCEDURE EVALUATION
Relevant Problems   No relevant active problems       Anesthetic History     PONV          Review of Systems / Medical History  Patient summary reviewed, nursing notes reviewed and pertinent labs reviewed    Pulmonary  Within defined limits                 Neuro/Psych   Within defined limits           Cardiovascular            Dysrhythmias : PVC      Exercise tolerance: >4 METS  Comments: Mitral valve prolapse, no recent cardiac s/s   GI/Hepatic/Renal     GERD           Endo/Other  Within defined limits           Other Findings   Comments: Recovering drug addict- no use for years         Physical Exam    Airway  Mallampati: II  TM Distance: 4 - 6 cm  Neck ROM: normal range of motion   Mouth opening: Normal     Cardiovascular  Regular rate and rhythm,  S1 and S2 normal,  no murmur, click, rub, or gallop             Dental  No notable dental hx       Pulmonary  Breath sounds clear to auscultation               Abdominal  GI exam deferred       Other Findings            Anesthetic Plan    ASA: 2  Anesthesia type: MAC          Induction: Intravenous  Anesthetic plan and risks discussed with: Patient

## 2022-06-01 ENCOUNTER — LAB ONLY (OUTPATIENT)
Dept: FAMILY MEDICINE CLINIC | Age: 27
End: 2022-06-01

## 2022-06-01 ENCOUNTER — OFFICE VISIT (OUTPATIENT)
Dept: FAMILY MEDICINE CLINIC | Age: 27
End: 2022-06-01
Payer: COMMERCIAL

## 2022-06-01 VITALS
BODY MASS INDEX: 22.91 KG/M2 | DIASTOLIC BLOOD PRESSURE: 70 MMHG | WEIGHT: 146 LBS | HEART RATE: 87 BPM | OXYGEN SATURATION: 98 % | HEIGHT: 67 IN | TEMPERATURE: 97.8 F | RESPIRATION RATE: 16 BRPM | SYSTOLIC BLOOD PRESSURE: 112 MMHG

## 2022-06-01 DIAGNOSIS — Z11.1 SCREENING-PULMONARY TB: ICD-10-CM

## 2022-06-01 DIAGNOSIS — Z02.0 SCHOOL PHYSICAL EXAM: Primary | ICD-10-CM

## 2022-06-01 DIAGNOSIS — Z02.0 SCHOOL PHYSICAL EXAM: ICD-10-CM

## 2022-06-01 DIAGNOSIS — Z11.59 SCREENING FOR VIRAL DISEASE: ICD-10-CM

## 2022-06-01 DIAGNOSIS — Z23 ENCOUNTER FOR IMMUNIZATION: ICD-10-CM

## 2022-06-01 PROCEDURE — 99203 OFFICE O/P NEW LOW 30 MIN: CPT | Performed by: NURSE PRACTITIONER

## 2022-06-01 PROCEDURE — 90715 TDAP VACCINE 7 YRS/> IM: CPT | Performed by: NURSE PRACTITIONER

## 2022-06-01 RX ORDER — ESCITALOPRAM OXALATE 20 MG/1
TABLET ORAL
COMMUNITY

## 2022-06-01 RX ORDER — OMEPRAZOLE 20 MG/1
20 CAPSULE, DELAYED RELEASE ORAL DAILY
COMMUNITY

## 2022-06-01 NOTE — PATIENT INSTRUCTIONS
Vaccine Information Statement    Tdap (Tetanus, Diphtheria, Pertussis) Vaccine: What You Need to Know     Many vaccine information statements are available in [place]ic and other languages. See www.immunize.org/vis. Hojas de información sobre vacunas están disponibles en español y en muchos otros idiomas. Visite www.immunize.org/vis. 1. Why get vaccinated? Tdap vaccine can prevent tetanus, diphtheria, and pertussis. Diphtheria and pertussis spread from person to person. Tetanus enters the body through cuts or wounds.  TETANUS (T) causes painful stiffening of the muscles. Tetanus can lead to serious health problems, including being unable to open the mouth, having trouble swallowing and breathing, or death.  DIPHTHERIA (D) can lead to difficulty breathing, heart failure, paralysis, or death.  PERTUSSIS (aP), also known as whooping cough, can cause uncontrollable, violent coughing that makes it hard to breathe, eat, or drink. Pertussis can be extremely serious especially in babies and young children, causing pneumonia, convulsions, brain damage, or death. In teens and adults, it can cause weight loss, loss of bladder control, passing out, and rib fractures from severe coughing. 2. Tdap vaccine     Tdap is only for children 7 years and older, adolescents, and adults. Adolescents should receive a single dose of Tdap, preferably at age 6 or 15 years. Pregnant people should get a dose of Tdap during every pregnancy, preferably during the early part of the third trimester, to help protect the  from pertussis. Infants are most at risk for severe, life-threatening complications from pertussis. Adults who have never received Tdap should get a dose of Tdap.       Also, adults should receive a booster dose of either Tdap or Td (a different vaccine that protects against tetanus and diphtheria but not pertussis) every 10 years, or after 5 years in the case of a severe or dirty wound or burn. Tdap may be given at the same time as other vaccines. 3. Talk with your health care provider    Tell your vaccination provider if the person getting the vaccine:   Has had an allergic reaction after a previous dose of any vaccine that protects against tetanus, diphtheria, or pertussis, or has any severe, life-threatening allergies    Has had a coma, decreased level of consciousness, or prolonged seizures within 7 days after a previous dose of any pertussis vaccine (DTP, DTaP, or Tdap)   Has seizures or another nervous system problem   Has ever had Guillain-Barré Syndrome (also called GBS)   Has had severe pain or swelling after a previous dose of any vaccine that protects against tetanus or diphtheria    In some cases, your health care provider may decide to postpone Tdap vaccination until a future visit. People with minor illnesses, such as a cold, may be vaccinated. People who are moderately or severely ill should usually wait until they recover before getting Tdap vaccine. Your health care provider can give you more information. 4. Risks of a vaccine reaction     Pain, redness, or swelling where the shot was given, mild fever, headache, feeling tired, and nausea, vomiting, diarrhea, or stomachache sometimes happen after Tdap vaccination. People sometimes faint after medical procedures, including vaccination. Tell your provider if you feel dizzy or have vision changes or ringing in the ears. As with any medicine, there is a very remote chance of a vaccine causing a severe allergic reaction, other serious injury, or death. 5. What if there is a serious problem? An allergic reaction could occur after the vaccinated person leaves the clinic.  If you see signs of a severe allergic reaction (hives, swelling of the face and throat, difficulty breathing, a fast heartbeat, dizziness, or weakness), call 9-1-1 and get the person to the nearest hospital.    For other signs that concern you, call your health care provider. Adverse reactions should be reported to the Vaccine Adverse Event Reporting System (VAERS). Your health care provider will usually file this report, or you can do it yourself. Visit the VAERS website at www.vaers. hhs.gov or call 9-315.155.9397. VAERS is only for reporting reactions, and VAERS staff members do not give medical advice. 6. The National Vaccine Injury Compensation Program    The [facility] Vaccine Injury Compensation Program (VICP) is a federal program that was created to compensate people who may have been injured by certain vaccines. Claims regarding alleged injury or death due to vaccination have a time limit for filing, which may be as short as two years. Visit the VICP website at www.[facility].gov/vaccinecompensation or call 3-655.302.4200 to learn about the program and about filing a claim. 7. How can I learn more?  Ask your health care provider.  Call your local or state health department.  Visit the website of the Food and Drug Administration (FDA) for vaccine package inserts and additional information at www.fda.gov/vaccines-blood-biologics/vaccines.  Contact the Centers for Disease Control and Prevention (CDC):  - Call 3-898.670.7631 (1-800-CDC-INFO) or  - Visit CDCs website at www.cdc.gov/vaccines. Vaccine Information Statement   Tdap (Tetanus, Diphtheria, Pertussis) Vaccine  8/6/2021  42 JEM Ceron [id number]-38   Department of Health and Human Services  Centers for Disease Control and Prevention    Office Use Only

## 2022-06-01 NOTE — PROGRESS NOTES
Identified pt with two pt identifiers(name and ). Chief Complaint   Patient presents with    New Patient    Labs     fasting        Health Maintenance Due   Topic    Hepatitis C Screening     Depression Screen     HPV Age 9Y-34Y (1 - 2-dose series)    DTaP/Tdap/Td series (1 - Tdap)    Pap Smear     COVID-19 Vaccine (3 - Booster for Pfizer series)       Wt Readings from Last 3 Encounters:   22 146 lb (66.2 kg)   21 140 lb 6.9 oz (63.7 kg)   21 138 lb (62.6 kg)     Temp Readings from Last 3 Encounters:   22 97.8 °F (36.6 °C) (Temporal)   21 98.8 °F (37.1 °C)   21 98 °F (36.7 °C)     BP Readings from Last 3 Encounters:   22 112/70   21 116/62   21 97/60     Pulse Readings from Last 3 Encounters:   22 87   21 79   21 61         Learning Assessment:  :     No flowsheet data found. Depression Screening:  :     3 most recent PHQ Screens 2022   Little interest or pleasure in doing things Not at all   Feeling down, depressed, irritable, or hopeless Not at all   Total Score PHQ 2 0       Fall Risk Assessment:  :     No flowsheet data found. Abuse Screening:  :     Abuse Screening Questionnaire 2022   Do you ever feel afraid of your partner? N   Are you in a relationship with someone who physically or mentally threatens you? N   Is it safe for you to go home? Y       Coordination of Care Questionnaire:  :     1) Have you been to an emergency room, urgent care clinic since your last visit? no   Hospitalized since your last visit? no             2) Have you seen or consulted any other health care providers outside of [address] [place] [address] since your last visit?  yes Psych and Threapist  (Include any pap smears or colon screenings in this section.)    3) Do you have an Advance Directive on file? has  Are you interested in receiving information about Advance Directives? no    Patient is accompanied by N/A I have received verbal consent from Dhara Potter to discuss any/all medical information while they are present in the room. 4.  For patients aged 39-70: Has the patient had a colonoscopy / FIT/ Cologuard? NA - based on age      If the patient is female:    11. For patients aged 41-77: Has the patient had a mammogram within the past 2 years? NA - based on age or sex      10. For patients aged 21-65: Has the patient had a pap smear?  No

## 2022-06-01 NOTE — PROGRESS NOTES
HISTORY OF PRESENT ILLNESS  Sarbjit Cary is a 24 y.o. female. HPI  Pt presents as a new patient  She was not getting care previously    Psychiatrist- Kellen Rock, seen for depression and manages her medications    She is going to nursing school, and needs physical form completed. Review of Systems   Constitutional: Negative for fever. Physical Exam  Constitutional:       Appearance: Normal appearance. HENT:      Head: Normocephalic and atraumatic. Right Ear: Tympanic membrane normal.      Left Ear: Tympanic membrane normal.      Nose: Nose normal.   Eyes:      Extraocular Movements: Extraocular movements intact. Conjunctiva/sclera: Conjunctivae normal.      Pupils: Pupils are equal, round, and reactive to light. Cardiovascular:      Rate and Rhythm: Normal rate and regular rhythm. Heart sounds: Normal heart sounds. Pulmonary:      Effort: Pulmonary effort is normal.      Breath sounds: Normal breath sounds. Abdominal:      General: Abdomen is flat. Bowel sounds are normal.      Palpations: Abdomen is soft. Musculoskeletal:         General: Normal range of motion. Skin:     General: Skin is warm and dry. Neurological:      Mental Status: She is alert and oriented to person, place, and time. Psychiatric:         Mood and Affect: Mood normal.         Behavior: Behavior normal.         ASSESSMENT and PLAN    ICD-10-CM ICD-9-CM    1. School physical exam  Z02.0 V70.5 MEASLES/MUMPS/RUBELLA IMMUNITY      VZV AB, IGG      HEP B SURFACE AB      QUANTIFERON-TB GOLD PLUS   2. Encounter for immunization  Z23 V03.89 TETANUS, DIPHTHERIA TOXOIDS AND ACELLULAR PERTUSSIS VACCINE (TDAP), IN INDIVIDS. >=7, IM   3. Screening for viral disease  Z11.59 V73.99 MEASLES/MUMPS/RUBELLA IMMUNITY      VZV AB, IGG      HEP B SURFACE AB   4.  Screening-pulmonary TB  Z11.1 V74.1 QUANTIFERON-TB GOLD PLUS     Will notify when labs return    Vaccine and VIS given    Pt informed to return to office with worsening of symptoms, or PRN with any questions or concerns. Pt verbalizes understanding of plan of care and denies further questions or concerns at this time.

## 2022-06-02 ENCOUNTER — TELEPHONE (OUTPATIENT)
Dept: FAMILY MEDICINE CLINIC | Age: 27
End: 2022-06-02

## 2022-06-02 LAB
HBV SURFACE AB SER QL: NONREACTIVE
HBV SURFACE AB SER-ACNC: 7.68 MIU/ML

## 2022-06-02 NOTE — TELEPHONE ENCOUNTER
----- Message from Angie Brunson NP sent at 6/2/2022  7:45 AM EDT -----  Please call patient and let her know that her Hep C surface ab returned non reactive. I would assume school would want her to get Hep B vaccine. She should confirm with them, and can return for this with office visit should that be correct.   Still awaiting other labs, I would tell her to wait for all labs to return to see if she needs other vaccines  Thanks

## 2022-06-03 ENCOUNTER — TELEPHONE (OUTPATIENT)
Dept: FAMILY MEDICINE CLINIC | Age: 27
End: 2022-06-03

## 2022-06-03 LAB
MEV IGG SER IA-ACNC: 62.7 AU/ML
MUV IGG SER IA-ACNC: 70.7 AU/ML
RUBV IGG SERPL IA-ACNC: 3.12 INDEX
VZV IGG SER IA-ACNC: <135 INDEX

## 2022-06-03 NOTE — TELEPHONE ENCOUNTER
----- Message from Rafael Garcia NP sent at 6/3/2022  7:55 AM EDT -----  Please call patient and let her know that her varicella titer also returned negative. Again, should confirm with school what they need at this time.   If she needs more vaccines (I suspect this is what they will want), she can make office appointment for these  Thanks

## 2022-06-03 NOTE — PROGRESS NOTES
Please call patient and let her know that her varicella titer also returned negative. Again, should confirm with school what they need at this time.   If she needs more vaccines (I suspect this is what they will want), she can make office appointment for these  Thanks

## 2022-06-05 LAB
GAMMA INTERFERON BACKGROUND BLD IA-ACNC: 0.02 IU/ML
M TB IFN-G BLD-IMP: NEGATIVE
M TB IFN-G CD4+ BCKGRND COR BLD-ACNC: 0.02 IU/ML
MITOGEN IGNF BLD-ACNC: >10 IU/ML
QUANTIFERON TB2 AG: 0.02 IU/ML
SERVICE CMNT-IMP: NORMAL

## 2022-06-06 ENCOUNTER — TELEPHONE (OUTPATIENT)
Dept: FAMILY MEDICINE CLINIC | Age: 27
End: 2022-06-06

## 2022-06-06 NOTE — TELEPHONE ENCOUNTER
----- Message from Tonia Drew NP sent at 6/6/2022  7:53 AM EDT -----  Please call patient and let her know that her TB returned negative, normal.  Does she want me to complete her forms with current labs or is she coming in for more vaccines?   Thanks

## 2022-06-06 NOTE — PROGRESS NOTES
Please call patient and let her know that her TB returned negative, normal.  Does she want me to complete her forms with current labs or is she coming in for more vaccines?   Thanks

## 2022-06-09 ENCOUNTER — LAB ONLY (OUTPATIENT)
Dept: FAMILY MEDICINE CLINIC | Age: 27
End: 2022-06-09

## 2022-06-09 ENCOUNTER — OFFICE VISIT (OUTPATIENT)
Dept: FAMILY MEDICINE CLINIC | Age: 27
End: 2022-06-09
Payer: COMMERCIAL

## 2022-06-09 VITALS
DIASTOLIC BLOOD PRESSURE: 70 MMHG | TEMPERATURE: 97.8 F | HEIGHT: 67 IN | SYSTOLIC BLOOD PRESSURE: 126 MMHG | WEIGHT: 145 LBS | HEART RATE: 97 BPM | OXYGEN SATURATION: 98 % | RESPIRATION RATE: 16 BRPM | BODY MASS INDEX: 22.76 KG/M2

## 2022-06-09 DIAGNOSIS — R53.83 FATIGUE, UNSPECIFIED TYPE: Primary | ICD-10-CM

## 2022-06-09 DIAGNOSIS — R53.83 FATIGUE, UNSPECIFIED TYPE: ICD-10-CM

## 2022-06-09 DIAGNOSIS — Z23 ENCOUNTER FOR IMMUNIZATION: ICD-10-CM

## 2022-06-09 PROCEDURE — 99213 OFFICE O/P EST LOW 20 MIN: CPT | Performed by: NURSE PRACTITIONER

## 2022-06-09 PROCEDURE — 90746 HEPB VACCINE 3 DOSE ADULT IM: CPT | Performed by: NURSE PRACTITIONER

## 2022-06-09 PROCEDURE — 90716 VAR VACCINE LIVE SUBQ: CPT | Performed by: NURSE PRACTITIONER

## 2022-06-09 NOTE — PATIENT INSTRUCTIONS
Vaccine Information Statement    Hepatitis B Vaccine: What You Need to Know    Many vaccine information statements are available in [place] and other languages. See www.immunize.org/vis. Hojas de información sobre vacunas están disponibles en español y en muchos otros idiomas. Visite www.immunize.org/vis. 1. Why get vaccinated? Hepatitis B vaccine can prevent hepatitis B. Hepatitis B is a liver disease that can cause mild illness lasting a few weeks, or it can lead to a serious, lifelong illness.  Acute hepatitis B infection is a short-term illness that can lead to fever, fatigue, loss of appetite, nausea, vomiting, jaundice (yellow skin or eyes, dark urine, ellie-colored bowel movements), and pain in the muscles, joints, and stomach.  Chronic hepatitis B infection is a long-term illness that occurs when the hepatitis B virus remains in a persons body. Most people who go on to develop chronic hepatitis B do not have symptoms, but it is still very serious and can lead to liver damage (cirrhosis), liver cancer, and death. Chronically infected people can spread hepatitis B virus to others, even if they do not feel or look sick themselves. Hepatitis B is spread when blood, semen, or other body fluid infected with the hepatitis B virus enters the body of a person who is not infected. People can become infected through:  [employer] (if a pregnant person has hepatitis B, their baby can become infected)   Sharing items such as razors or toothbrushes with an infected person   Contact with the blood or open sores of an infected person   Sex with an infected partner   Sharing needles, syringes, or other drug-injection equipment   Exposure to blood from needlesticks or other sharp instruments    Most people who are vaccinated with hepatitis B vaccine are immune for life. 2. Hepatitis B vaccine    Hepatitis B vaccine is usually given as 2, 3, or 4 shots.     Infants should get their first dose of hepatitis B vaccine at birth and will usually complete the series at 1018 months of age. The birth dose of hepatitis B vaccine is an important part of preventing long-term illness in infants and the spread of hepatitis B in the [place]. Children and adolescents younger than 23years of age who have not yet gotten the vaccine should be vaccinated. Adults who were not vaccinated previously and want to be protected against hepatitis B can also get the vaccine. Hepatitis B vaccine is also recommended for the following people:     People whose sex partners have hepatitis B   Sexually active persons who are not in a long-term, monogamous relationship   People seeking evaluation or treatment for a sexually transmitted disease   Victims of sexual assault or abuse   Men who have sexual contact with other men   People who share needles, syringes, or other drug-injection equipment   People who live with someone infected with the hepatitis B virus  [address] care and public safety workers at risk for exposure to blood or body fluids   Residents and staff of facilities for developmentally disabled people  [address] living in long term or correction   Travelers to regions with increased rates of hepatitis B   People with chronic liver disease, kidney disease on dialysis, HIV infection, infection with hepatitis C, or diabetes    Hepatitis B vaccine may be given as a stand-alone vaccine, or as part of a combination vaccine (a type of vaccine that combines more than one vaccine together into one shot). Hepatitis B vaccine may be given at the same time as other vaccines.     3. Talk with your health care provider    Tell your vaccination provider if the person getting the vaccine:   Has had an allergic reaction after a previous dose of hepatitis B vaccine, or has any severe, life-threatening allergies     In some cases, your health care provider may decide to postpone hepatitis B vaccination until a future visit.    Pregnant or breastfeeding people should be vaccinated if they are at risk for getting hepatitis B. Pregnancy or breastfeeding are not reasons to avoid hepatitis B vaccination. People with minor illnesses, such as a cold, may be vaccinated. People who are moderately or severely ill should usually wait until they recover before getting hepatitis B vaccine. Your health care provider can give you more information. 4. Risks of a vaccine reaction     Soreness where the shot is given or fever can happen after hepatitis B vaccination. People sometimes faint after medical procedures, including vaccination. Tell your provider if you feel dizzy or have vision changes or ringing in the ears. As with any medicine, there is a very remote chance of a vaccine causing a severe allergic reaction, other serious injury, or death. 5. What if there is a serious problem? An allergic reaction could occur after the vaccinated person leaves the clinic. If you see signs of a severe allergic reaction (hives, swelling of the face and throat, difficulty breathing, a fast heartbeat, dizziness, or weakness), call 9-1-1 and get the person to the nearest hospital.    For other signs that concern you, call your health care provider. Adverse reactions should be reported to the Vaccine Adverse Event Reporting System (VAERS). Your health care provider will usually file this report, or you can do it yourself. Visit the VAERS website at www.vaers. hhs.gov or call 6-436.932.3570. VAERS is only for reporting reactions, and VAERS staff members do not give medical advice. 6. The National Vaccine Injury Compensation Program    The [facility] Vaccine Injury Compensation Program (VICP) is a federal program that was created to compensate people who may have been injured by certain vaccines. Claims regarding alleged injury or death due to vaccination have a time limit for filing, which may be as short as two years.  Visit the VICP website at www.hrsa.gov/vaccinecompensation or call 8-739.498.2155 to learn about the program and about filing a claim. 7. How can I learn more?  Ask your health care provider.  Call your local or state health department.  Visit the website of the Food and Drug Administration (FDA) for vaccine package inserts and additional information at https://www.reyes.com/.  Contact the Centers for Disease Control and Prevention (CDC):  - Call 1-972.496.4660 (1-800-CDC-INFO) or  - Visit CDCs website at www.cdc.gov/vaccines. Vaccine Information Statement   Hepatitis B Vaccine   10/15/2021  42 JEM Alvarez [id number]   Department of Health and Human Services  Centers for Disease Control and Prevention    Office Use Only      Vaccine Information Statement     Varicella (Chickenpox) Vaccine: What You Need to Know    Many vaccine information statements are available in [place] and other languages. See www.immunize.org/vis. Hojas de información sobre vacunas están disponibles en español y en muchos otros idiomas. Visite www.immunize.org/vis. 1. Why get vaccinated? Varicella vaccine can prevent varicella. Varicella, also called chickenpox, causes an itchy rash that usually lasts about a week. It can also cause fever, tiredness, loss of appetite, and headache. It can lead to skin infections, pneumonia, inflammation of the blood vessels, swelling of the brain and/or spinal cord covering, and infections of the bloodstream, bone, or joints. Some people who get chickenpox get a painful rash called shingles (also known as herpes zoster) years later. Chickenpox is usually mild, but it can be serious in infants under 15months of age, adolescents, adults, pregnant people, and people with a weakened immune system. Some people get so sick that they need to be hospitalized. It doesnt happen often, but people can die from chickenpox.     Most people who are vaccinated with 2 doses of varicella vaccine will be protected for life. 2. Varicella vaccine    Children need 2 doses of varicella vaccine, usually:   First dose: age 15 through 17 months   [facility] Second dose: age 3 through 6 years     Older children, adolescents, and adults also need 2 doses of varicella vaccine if they are not already immune to chickenpox. Varicella vaccine may be given at the same time as other vaccines. Also, a child between 13 months and 15years of age might receive varicella vaccine together with MMR (measles, mumps, and rubella) vaccine in a single shot, known as MMRV. Your health care provider can give you more information. 3. Talk with your health care provider    Tell your vaccination provider if the person getting the vaccine:   Has had an allergic reaction after a previous dose of varicella vaccine, or has any severe, life-threatening allergies   Is pregnant or thinks they might be pregnantpregnant people should not get varicella vaccine   Has a weakened immune system, or has a parent, brother, or sister with a history of hereditary or congenital immune system problems   Is taking salicylates (such as aspirin)   Has recently had a blood transfusion or received other blood products   Has tuberculosis   Has gotten any other vaccines in the past 4 weeks    In some cases, your health care provider may decide to postpone varicella vaccination until a future visit. People with minor illnesses, such as a cold, may be vaccinated. People who are moderately or severely ill should usually wait until they recover before getting varicella vaccine. Your health care provider can give you more information. 4. Risks of a vaccine reaction     Sore arm from the injection, redness or rash where the shot is given, or fever can happen after varicella vaccination.  More serious reactions happen very rarely.  These can include pneumonia, infection of the brain and/or spinal cord covering, or seizures that are often associated with fever.  In people with serious immune system problems, this vaccine may cause an infection that may be life-threatening. People with serious immune system problems should not get varicella vaccine. It is possible for a vaccinated person to develop a rash. If this happens, the varicella vaccine virus could be spread to an unprotected person. Anyone who gets a rash should stay away from infants and people with a weakened immune system until the rash goes away. Talk with your health care provider to learn more. Some people who are vaccinated against chickenpox get shingles (herpes zoster) years later. This is much less common after vaccination than after chickenpox disease. People sometimes faint after medical procedures, including vaccination. Tell your provider if you feel dizzy or have vision changes or ringing in the ears. As with any medicine, there is a very remote chance of a vaccine causing a severe allergic reaction, other serious injury, or death. 5. What if there is a serious problem? An allergic reaction could occur after the vaccinated person leaves the clinic. If you see signs of a severe allergic reaction (hives, swelling of the face and throat, difficulty breathing, a fast heartbeat, dizziness, or weakness), call 9-1-1 and get the person to the nearest hospital.    For other signs that concern you, call your health care provider. Adverse reactions should be reported to the Vaccine Adverse Event Reporting System (VAERS). Your health care provider will usually file this report, or you can do it yourself. Visit the VAERS website at www.vaers. hhs.gov or call 7-720.513.5818. VAERS is only for reporting reactions, and VAERS staff members do not give medical advice.     6. The National Vaccine Injury Compensation Program    The National Vaccine Injury Compensation Program (VICP) is a federal program that was created to compensate people who may have been injured by certain vaccines. Claims   regarding alleged injury or death due to vaccination have a time limit for filing, which may   be as short as two years. Visit the VICP website at www.hrsa.gov/vaccinecompensation or   call [phone] 71 [phone] to learn about the program and about filing a claim. 7. How can I learn more?  Ask your health care provider.  Call your local or state health department.  Visit the website of the Food and Drug Administration (FDA) for vaccine package inserts and additional information at www.fda.gov/vaccines-blood-biologics/vaccines.  Contact the Centers for Disease Control and Prevention (CDC):  - Call 8-831.189.7641 (1-800-CDC-INFO) or  - Visit CDCs website at www.cdc.gov/vaccines. Vaccine Information Statement   Varicella Vaccine   8/6/2021  42 JEM Nava [id number]   Department of Health and Human Services  Centers for Disease Control and Prevention    Office Use Only

## 2022-06-09 NOTE — PROGRESS NOTES
HISTORY OF PRESENT ILLNESS  Kaykay Trivedi is a 24 y.o. female. HPI  Pt presents with \"fatigue and vaccines\"    Pt states that she has noted some fatigue over the past couple of months  This concerns her, as she feels like she could go to sleep soon after waking  She has had trouble sleeping recently, and doesn't know if this is just catching up with her, or if something is wrong? 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 Physical Exam  Constitutional:       Appearance: Normal appearance. Cardiovascular:      Rate and Rhythm: Normal rate and regular rhythm. Heart sounds: Normal heart sounds. Pulmonary:      Effort: Pulmonary effort is normal.      Breath sounds: Normal breath sounds. Neurological:      Mental Status: She is alert. Psychiatric:         Mood and Affect: Mood normal.         Behavior: Behavior normal.         ASSESSMENT and PLAN    ICD-10-CM ICD-9-CM    1. Fatigue, unspecified type  R53.83 780.79 CBC W/O DIFF      METABOLIC PANEL, COMPREHENSIVE      THYROID CASCADE PROFILE   2. Encounter for immunization  Z23 V03.89 HEPATITIS B VACCINE, ADULT DOSAGE, IM      VARICELLA, VARIVAX, (AGE 12 MO+), SC     Educated about getting labs today, will notify when these return    Vaccines and VIS given    Pt informed to return to office with worsening of symptoms, or PRN with any questions or concerns. Pt verbalizes understanding of plan of care and denies further questions or concerns at this time.

## 2022-06-09 NOTE — PROGRESS NOTES
Identified pt with two pt identifiers(name and ). 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        Health Maintenance Due   Topic    Hepatitis C Screening     HPV Age 9Y-34Y (1 - 2-dose series)    Pap Smear     COVID-19 Vaccine (3 - Booster for Pfizer series)       Wt Readings from Last 3 Encounters:   22 145 lb (65.8 kg)   22 146 lb (66.2 kg)   21 140 lb 6.9 oz (63.7 kg)     Temp Readings from Last 3 Encounters:   22 97.8 °F (36.6 °C) (Temporal)   22 97.8 °F (36.6 °C) (Temporal)   21 98.8 °F (37.1 °C)     BP Readings from Last 3 Encounters:   22 126/70   22 112/70   21 116/62     Pulse Readings from Last 3 Encounters:   22 97   22 87   21 79         Learning Assessment:  :     No flowsheet data found. Depression Screening:  :     3 most recent PHQ Screens 2022   Little interest or pleasure in doing things Not at all   Feeling down, depressed, irritable, or hopeless Not at all   Total Score PHQ 2 0       Fall Risk Assessment:  :     No flowsheet data found. Abuse Screening:  :     Abuse Screening Questionnaire 2022   Do you ever feel afraid of your partner? N N   Are you in a relationship with someone who physically or mentally threatens you? N N   Is it safe for you to go home? Y Y       Coordination of Care Questionnaire:  :     1) Have you been to an emergency room, urgent care clinic since your last visit? no   Hospitalized since your last visit? no             2) Have you seen or consulted any other health care providers outside of [address] [place] [address] since your last visit? no  (Include any pap smears or colon screenings in this section.)    3) Do you have an Advance Directive on file?  no  Are you interested in receiving information about Advance Directives? no    Patient is accompanied by N/A I have received verbal consent from [place] to discuss any/all medical information while they are present in the room. 4.  For patients aged 39-70: Has the patient had a colonoscopy / FIT/ Cologuard? NA - based on age      If the patient is female:    11. For patients aged 41-77: Has the patient had a mammogram within the past 2 years? NA - based on age or sex      10. For patients aged 21-65: Has the patient had a pap smear?  No

## 2022-06-10 LAB
ALBUMIN SERPL-MCNC: 3.8 G/DL (ref 3.5–5)
ALBUMIN/GLOB SERPL: 1.3 {RATIO} (ref 1.1–2.2)
ALP SERPL-CCNC: 66 U/L (ref 45–117)
ALT SERPL-CCNC: 20 U/L (ref 12–78)
ANION GAP SERPL CALC-SCNC: 7 MMOL/L (ref 5–15)
AST SERPL-CCNC: 17 U/L (ref 15–37)
BILIRUB SERPL-MCNC: 0.2 MG/DL (ref 0.2–1)
BUN SERPL-MCNC: 16 MG/DL (ref 6–20)
BUN/CREAT SERPL: 20 (ref 12–20)
CALCIUM SERPL-MCNC: 9.3 MG/DL (ref 8.5–10.1)
CHLORIDE SERPL-SCNC: 107 MMOL/L (ref 97–108)
CO2 SERPL-SCNC: 25 MMOL/L (ref 21–32)
CREAT SERPL-MCNC: 0.8 MG/DL (ref 0.55–1.02)
ERYTHROCYTE [DISTWIDTH] IN BLOOD BY AUTOMATED COUNT: 12.4 % (ref 11.5–14.5)
GLOBULIN SER CALC-MCNC: 3 G/DL (ref 2–4)
GLUCOSE SERPL-MCNC: 91 MG/DL (ref 65–100)
HCT VFR BLD AUTO: 42.7 % (ref 35–47)
HGB BLD-MCNC: 13.4 G/DL (ref 11.5–16)
MCH RBC QN AUTO: 29.5 PG (ref 26–34)
MCHC RBC AUTO-ENTMCNC: 31.4 G/DL (ref 30–36.5)
MCV RBC AUTO: 94.1 FL (ref 80–99)
NRBC # BLD: 0 K/UL (ref 0–0.01)
NRBC BLD-RTO: 0 PER 100 WBC
PLATELET # BLD AUTO: 358 K/UL (ref 150–400)
PMV BLD AUTO: 10.5 FL (ref 8.9–12.9)
POTASSIUM SERPL-SCNC: 4.3 MMOL/L (ref 3.5–5.1)
PROT SERPL-MCNC: 6.8 G/DL (ref 6.4–8.2)
RBC # BLD AUTO: 4.54 M/UL (ref 3.8–5.2)
SODIUM SERPL-SCNC: 139 MMOL/L (ref 136–145)
WBC # BLD AUTO: 8.8 K/UL (ref 3.6–11)

## 2022-06-11 LAB — TSH SERPL-ACNC: 0.95 UIU/ML (ref 0.45–4.5)

## 2022-06-13 ENCOUNTER — TELEPHONE (OUTPATIENT)
Dept: FAMILY MEDICINE CLINIC | Age: 27
End: 2022-06-13

## 2022-06-13 NOTE — TELEPHONE ENCOUNTER
----- Message from Abilio Franz NP sent at 6/11/2022  7:16 AM EDT -----  Please call patient and let her know that her labs returned stable  Thanks

## 2022-06-21 ENCOUNTER — TELEPHONE (OUTPATIENT)
Dept: FAMILY MEDICINE CLINIC | Age: 27
End: 2022-06-21

## 2022-06-21 NOTE — TELEPHONE ENCOUNTER
----- Message from Farzaneh Roche sent at 6/21/2022  2:13 PM EDT -----  Subject: Message to Provider    QUESTIONS  Information for Provider? 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 SHE IS NOW NEEDING HER   NEXT SET BUT IS NOT SURE WHEN SHE SHOULD SCHEDULE FOR THEM. SHE NEEDS THE   HEPATITIS B AND VARICELLA VACCINE  ---------------------------------------------------------------------------  --------------  CALL BACK INFO  What is the best way for the office to contact you? OK to leave message on   voicemail  Preferred Call Back Phone Number? [phone]  ---------------------------------------------------------------------------  --------------  SCRIPT ANSWERS  Relationship to Patient?  Self

## 2022-07-13 ENCOUNTER — OFFICE VISIT (OUTPATIENT)
Dept: FAMILY MEDICINE CLINIC | Age: 27
End: 2022-07-13
Payer: COMMERCIAL

## 2022-07-13 VITALS
BODY MASS INDEX: 23.23 KG/M2 | DIASTOLIC BLOOD PRESSURE: 70 MMHG | HEIGHT: 67 IN | RESPIRATION RATE: 16 BRPM | HEART RATE: 81 BPM | WEIGHT: 148 LBS | TEMPERATURE: 97.8 F | SYSTOLIC BLOOD PRESSURE: 122 MMHG | OXYGEN SATURATION: 98 %

## 2022-07-13 DIAGNOSIS — Z23 ENCOUNTER FOR IMMUNIZATION: Primary | ICD-10-CM

## 2022-07-13 PROCEDURE — 90746 HEPB VACCINE 3 DOSE ADULT IM: CPT | Performed by: NURSE PRACTITIONER

## 2022-07-13 PROCEDURE — 90716 VAR VACCINE LIVE SUBQ: CPT | Performed by: NURSE PRACTITIONER

## 2022-07-13 RX ORDER — AMOXICILLIN 875 MG/1
TABLET, FILM COATED ORAL
COMMUNITY
Start: 2022-07-11

## 2022-07-13 RX ORDER — FLUCONAZOLE 150 MG/1
TABLET ORAL
COMMUNITY
Start: 2022-07-11

## 2022-07-13 NOTE — PROGRESS NOTES
Identified pt with two pt identifiers(name and ). Chief Complaint   Patient presents with    Immunization/Injection     for school         Health Maintenance Due   Topic    Hepatitis C Screening     HPV Age 9Y-34Y (1 - 2-dose series)    Pap Smear     COVID-19 Vaccine (3 - Booster for Pfizer series)       Wt Readings from Last 3 Encounters:   22 148 lb (67.1 kg)   22 145 lb (65.8 kg)   22 146 lb (66.2 kg)     Temp Readings from Last 3 Encounters:   22 97.8 °F (36.6 °C) (Temporal)   22 97.8 °F (36.6 °C) (Temporal)   22 97.8 °F (36.6 °C) (Temporal)     BP Readings from Last 3 Encounters:   22 122/70   22 126/70   22 112/70     Pulse Readings from Last 3 Encounters:   22 81   22 97   22 87         Learning Assessment:  :     No flowsheet data found. Depression Screening:  :     3 most recent PHQ Screens 2022   Little interest or pleasure in doing things Not at all   Feeling down, depressed, irritable, or hopeless Not at all   Total Score PHQ 2 0       Fall Risk Assessment:  :     No flowsheet data found. Abuse Screening:  :     Abuse Screening Questionnaire 2022   Do you ever feel afraid of your partner? N N N   Are you in a relationship with someone who physically or mentally threatens you? N N N   Is it safe for you to go home? Y Y Y       Coordination of Care Questionnaire:  :     1) Have you been to an emergency room, urgent care clinic since your last visit? no   Hospitalized since your last visit? no             2) Have you seen or consulted any other health care providers outside of [address] [place] [address] since your last visit? no  (Include any pap smears or colon screenings in this section.)    3) Do you have an Advance Directive on file?  no  Are you interested in receiving information about Advance Directives? no    Patient is accompanied by N/A I have received verbal consent from Shimon Dauley to discuss any/all medical information while they are present in the room. 4.  For patients aged 39-70: Has the patient had a colonoscopy / FIT/ Cologuard? No      If the patient is female:    5. For patients aged 41-77: Has the patient had a mammogram within the past 2 years? NA - based on age or sex      10. For patients aged 21-65: Has the patient had a pap smear?  No

## 2022-07-13 NOTE — PROGRESS NOTES
HISTORY OF PRESENT ILLNESS  Mich Shaffer is a 24 y.o. female. 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 Physical Exam  Constitutional:       Appearance: Normal appearance. Cardiovascular:      Rate and Rhythm: Normal rate and regular rhythm. Heart sounds: Normal heart sounds. Pulmonary:      Effort: Pulmonary effort is normal.      Breath sounds: Normal breath sounds. Neurological:      Mental Status: She is alert. Psychiatric:         Mood and Affect: Mood normal.         Behavior: Behavior normal.         ASSESSMENT and PLAN    ICD-10-CM ICD-9-CM    1. Encounter for immunization  Z23 V03.89 HEPATITIS B VACCINE, ADULT DOSAGE, IM      VARICELLA, VARIVAX, (AGE 12 MO+), SC     Vaccine and VIS given    Pt informed to return to office with worsening of symptoms, or PRN with any questions or concerns. Pt verbalizes understanding of plan of care and denies further questions or concerns at this time.

## 2022-07-13 NOTE — PATIENT INSTRUCTIONS
Vaccine Information Statement    Hepatitis B Vaccine: What You Need to Know    Many vaccine information statements are available in Romansh and other languages. See www.immunize.org/vis. Hojas de información sobre vacunas están disponibles en español y en muchos otros idiomas. Visite www.immunize.org/vis. 1. Why get vaccinated? Hepatitis B vaccine can prevent hepatitis B. Hepatitis B is a liver disease that can cause mild illness lasting a few weeks, or it can lead to a serious, lifelong illness.  Acute hepatitis B infection is a short-term illness that can lead to fever, fatigue, loss of appetite, nausea, vomiting, jaundice (yellow skin or eyes, dark urine, ellie-colored bowel movements), and pain in the muscles, joints, and stomach.  Chronic hepatitis B infection is a long-term illness that occurs when the hepatitis B virus remains in a persons body. Most people who go on to develop chronic hepatitis B do not have symptoms, but it is still very serious and can lead to liver damage (cirrhosis), liver cancer, and death. Chronically infected people can spread hepatitis B virus to others, even if they do not feel or look sick themselves. Hepatitis B is spread when blood, semen, or other body fluid infected with the hepatitis B virus enters the body of a person who is not infected. People can become infected through:  [employer] (if a pregnant person has hepatitis B, their baby can become infected)   Sharing items such as razors or toothbrushes with an infected person   Contact with the blood or open sores of an infected person   Sex with an infected partner   Sharing needles, syringes, or other drug-injection equipment   Exposure to blood from needlesticks or other sharp instruments    Most people who are vaccinated with hepatitis B vaccine are immune for life. 2. Hepatitis B vaccine    Hepatitis B vaccine is usually given as 2, 3, or 4 shots.     Infants should get their first dose of hepatitis B vaccine at birth and will usually complete the series at 7-21 months of age. The birth dose of hepatitis B vaccine is an important part of preventing long-term illness in infants and the spread of hepatitis B in the [place]. Children and adolescents younger than 23years of age who have not yet gotten the vaccine should be vaccinated. Adults who were not vaccinated previously and want to be protected against hepatitis B can also get the vaccine. Hepatitis B vaccine is also recommended for the following people:     People whose sex partners have hepatitis B   Sexually active persons who are not in a long-term, monogamous relationship   People seeking evaluation or treatment for a sexually transmitted disease   Victims of sexual assault or abuse   Men who have sexual contact with other men   People who share needles, syringes, or other drug-injection equipment   People who live with someone infected with the hepatitis B virus  [address] care and public safety workers at risk for exposure to blood or body fluids   Residents and staff of facilities for developmentally disabled people  [address] living in [facility] or [facility]   Travelers to regions with increased rates of hepatitis B   People with chronic liver disease, kidney disease on dialysis, HIV infection, infection with hepatitis C, or diabetes    Hepatitis B vaccine may be given as a stand-alone vaccine, or as part of a combination vaccine (a type of vaccine that combines more than one vaccine together into one shot). Hepatitis B vaccine may be given at the same time as other vaccines.     3. Talk with your health care provider    Tell your vaccination provider if the person getting the vaccine:   Has had an allergic reaction after a previous dose of hepatitis B vaccine, or has any severe, life-threatening allergies     In some cases, your health care provider may decide to postpone hepatitis B vaccination until a future visit.    Pregnant or breastfeeding people should be vaccinated if they are at risk for getting hepatitis B. Pregnancy or breastfeeding are not reasons to avoid hepatitis B vaccination. People with minor illnesses, such as a cold, may be vaccinated. People who are moderately or severely ill should usually wait until they recover before getting hepatitis B vaccine. Your health care provider can give you more information. 4. Risks of a vaccine reaction     Soreness where the shot is given or fever can happen after hepatitis B vaccination. People sometimes faint after medical procedures, including vaccination. Tell your provider if you feel dizzy or have vision changes or ringing in the ears. As with any medicine, there is a very remote chance of a vaccine causing a severe allergic reaction, other serious injury, or death. 5. What if there is a serious problem? An allergic reaction could occur after the vaccinated person leaves the clinic. If you see signs of a severe allergic reaction (hives, swelling of the face and throat, difficulty breathing, a fast heartbeat, dizziness, or weakness), call 9-1-1 and get the person to the nearest hospital.    For other signs that concern you, call your health care provider. Adverse reactions should be reported to the Vaccine Adverse Event Reporting System (VAERS). Your health care provider will usually file this report, or you can do it yourself. Visit the VAERS website at www.vaers. hhs.gov or call 4-899.476.9465. VAERS is only for reporting reactions, and VAERS staff members do not give medical advice. 6. The National Vaccine Injury Compensation Program    The [facility] Vaccine Injury Compensation Program (VICP) is a federal program that was created to compensate people who may have been injured by certain vaccines. Claims regarding alleged injury or death due to vaccination have a time limit for filing, which may be as short as two years.  Visit the VICP website at www.hrsa.gov/vaccinecompensation or call 6-356.613.6571 to learn about the program and about filing a claim. 7. How can I learn more?  Ask your health care provider.  Call your local or state health department.  Visit the website of the Food and Drug Administration (FDA) for vaccine package inserts and additional information at https://www.reyes.com/.  Contact the Centers for Disease Control and Prevention (CDC):  - Call 3-308.446.7057 (1-800-CDC-INFO) or  - Visit CDCs website at www.cdc.gov/vaccines. Vaccine Information Statement   Hepatitis B Vaccine   10/15/2021  42 JEM Cam So [id number]   Department of Health and Human Services  Centers for Disease Control and Prevention    Office Use Only      Vaccine Information Statement     Varicella (Chickenpox) Vaccine: What You Need to Know    Many vaccine information statements are available in Greek and other languages. See www.immunize.org/vis. Hojas de información sobre vacunas están disponibles en español y en muchos otros idiomas. Visite www.immunize.org/vis. 1. Why get vaccinated? Varicella vaccine can prevent varicella. Varicella, also called chickenpox, causes an itchy rash that usually lasts about a week. It can also cause fever, tiredness, loss of appetite, and headache. It can lead to skin infections, pneumonia, inflammation of the blood vessels, swelling of the brain and/or spinal cord covering, and infections of the bloodstream, bone, or joints. Some people who get chickenpox get a painful rash called shingles (also known as herpes zoster) years later. Chickenpox is usually mild, but it can be serious in infants under 15months of age, adolescents, adults, pregnant people, and people with a weakened immune system. Some people get so sick that they need to be hospitalized. It doesnt happen often, but people can die from chickenpox.     Most people who are vaccinated with 2 doses of varicella vaccine will be protected for life. 2. Varicella vaccine    Children need 2 doses of varicella vaccine, usually:   First dose: age 15 through 17 months   [place] Second dose: age 3 through 6 years     Older children, adolescents, and adults also need 2 doses of varicella vaccine if they are not already immune to chickenpox. Varicella vaccine may be given at the same time as other vaccines. Also, a child between 13 months and 15years of age might receive varicella vaccine together with MMR (measles, mumps, and rubella) vaccine in a single shot, known as MMRV. Your health care provider can give you more information. 3. Talk with your health care provider    Tell your vaccination provider if the person getting the vaccine:   Has had an allergic reaction after a previous dose of varicella vaccine, or has any severe, life-threatening allergies   Is pregnant or thinks they might be pregnant--pregnant people should not get varicella vaccine   Has a weakened immune system, or has a parent, brother, or sister with a history of hereditary or congenital immune system problems   Is taking salicylates (such as aspirin)   Has recently had a blood transfusion or received other blood products   Has tuberculosis   Has gotten any other vaccines in the past 4 weeks    In some cases, your health care provider may decide to postpone varicella vaccination until a future visit. People with minor illnesses, such as a cold, may be vaccinated. People who are moderately or severely ill should usually wait until they recover before getting varicella vaccine. Your health care provider can give you more information. 4. Risks of a vaccine reaction     Sore arm from the injection, redness or rash where the shot is given, or fever can happen after varicella vaccination.  More serious reactions happen very rarely.  These can include pneumonia, infection of the brain and/or spinal cord covering, or seizures that are often associated with fever.  In people with serious immune system problems, this vaccine may cause an infection that may be life-threatening. People with serious immune system problems should not get varicella vaccine. It is possible for a vaccinated person to develop a rash. If this happens, the varicella vaccine virus could be spread to an unprotected person. Anyone who gets a rash should stay away from infants and people with a weakened immune system until the rash goes away. Talk with your health care provider to learn more. Some people who are vaccinated against chickenpox get shingles (herpes zoster) years later. This is much less common after vaccination than after chickenpox disease. People sometimes faint after medical procedures, including vaccination. Tell your provider if you feel dizzy or have vision changes or ringing in the ears. As with any medicine, there is a very remote chance of a vaccine causing a severe allergic reaction, other serious injury, or death. 5. What if there is a serious problem? An allergic reaction could occur after the vaccinated person leaves the clinic. If you see signs of a severe allergic reaction (hives, swelling of the face and throat, difficulty breathing, a fast heartbeat, dizziness, or weakness), call 9-1-1 and get the person to the nearest hospital.    For other signs that concern you, call your health care provider. Adverse reactions should be reported to the Vaccine Adverse Event Reporting System (VAERS). Your health care provider will usually file this report, or you can do it yourself. Visit the VAERS website at www.vaers. hhs.gov or call 3-633.110.8929. VAERS is only for reporting reactions, and VAERS staff members do not give medical advice.     6. The National Vaccine Injury Compensation Program    The National Vaccine Injury Compensation Program (VICP) is a federal program that was created to compensate people who may have been injured by certain vaccines. Claims   regarding alleged injury or death due to vaccination have a time limit for filing, which may   be as short as two years. Visit the VICP website at www.hrsa.gov/vaccinecompensation or   call [phone] 79 10 to learn about the program and about filing a claim. 7. How can I learn more?  Ask your health care provider.  Call your local or state health department.  Visit the website of the Food and Drug Administration (FDA) for vaccine package inserts and additional information at www.fda.gov/vaccines-blood-biologics/vaccines.  Contact the Centers for Disease Control and Prevention (CDC):  - Call 2-316.848.2410 (1-800-CDC-INFO) or  - Visit CDCs website at www.cdc.gov/vaccines. Vaccine Information Statement   Varicella Vaccine   8/6/2021  42 U. Rosalie Seals [id number]   Department of Health and Human Services  Centers for Disease Control and Prevention    Office Use Only

## 2023-05-01 NOTE — PROGRESS NOTES
Please call patient and let her know that her Hep C surface ab returned non reactive. I would assume school would want her to get Hep B vaccine. She should confirm with them, and can return for this with office visit should that be correct.   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 Vascular consulted in ED, no acute surgical intervention. Can follow up w/ Dr. Hughes as outpatient.  - c/w home med xarelto 20mg qd  - started on atorvastatin 40mg daily  - ask vascular if should start aspirin

## 2023-08-01 ENCOUNTER — TELEPHONE (OUTPATIENT)
Age: 28
End: 2023-08-01

## 2023-08-01 NOTE — TELEPHONE ENCOUNTER
----- Message from Fauzia Munoz sent at 8/1/2023  1:29 PM EDT -----  Subject: Message to Provider    QUESTIONS  Information for Provider? Pt stated needs quanitferon TB test order faxed   over to [employer] for school if not pt will have to pay out of pocket for   this and would like a order faxed over . If someone can give the pt a call   back once it is done.  ---------------------------------------------------------------------------  --------------  Malia Boyer INFO  [phone]; OK to leave message on voicemail  ---------------------------------------------------------------------------  --------------  SCRIPT ANSWERS  Relationship to Patient?  Self

## 2023-08-03 ENCOUNTER — NURSE ONLY (OUTPATIENT)
Age: 28
End: 2023-08-03

## 2023-08-03 ENCOUNTER — OFFICE VISIT (OUTPATIENT)
Age: 28
End: 2023-08-03

## 2023-08-03 VITALS
OXYGEN SATURATION: 98 % | HEIGHT: 67 IN | SYSTOLIC BLOOD PRESSURE: 119 MMHG | HEART RATE: 85 BPM | DIASTOLIC BLOOD PRESSURE: 67 MMHG | WEIGHT: 154.4 LBS | TEMPERATURE: 97.2 F | BODY MASS INDEX: 24.23 KG/M2 | RESPIRATION RATE: 18 BRPM

## 2023-08-03 DIAGNOSIS — Z02.0 ENCOUNTER FOR SCHOOL HISTORY AND PHYSICAL EXAMINATION: Primary | ICD-10-CM

## 2023-08-03 DIAGNOSIS — Z02.0 ENCOUNTER FOR SCHOOL HISTORY AND PHYSICAL EXAMINATION: ICD-10-CM

## 2023-08-03 PROCEDURE — 99212 OFFICE O/P EST SF 10 MIN: CPT | Performed by: FAMILY MEDICINE

## 2023-08-03 RX ORDER — METHYLPHENIDATE HYDROCHLORIDE 54 MG/1
54 TABLET, EXTENDED RELEASE ORAL EVERY MORNING
COMMUNITY
Start: 2023-07-06

## 2023-08-03 RX ORDER — HYDROXYZINE HYDROCHLORIDE 25 MG/1
25 TABLET, FILM COATED ORAL
COMMUNITY

## 2023-08-03 RX ORDER — CLONAZEPAM 0.5 MG/1
TABLET ORAL
COMMUNITY
Start: 2023-05-12

## 2023-08-03 SDOH — ECONOMIC STABILITY: FOOD INSECURITY: WITHIN THE PAST 12 MONTHS, THE FOOD YOU BOUGHT JUST DIDN'T LAST AND YOU DIDN'T HAVE MONEY TO GET MORE.: NEVER TRUE

## 2023-08-03 SDOH — ECONOMIC STABILITY: HOUSING INSECURITY
IN THE LAST 12 MONTHS, WAS THERE A TIME WHEN YOU DID NOT HAVE A STEADY PLACE TO SLEEP OR SLEPT IN A SHELTER (INCLUDING NOW)?: NO

## 2023-08-03 SDOH — ECONOMIC STABILITY: INCOME INSECURITY: HOW HARD IS IT FOR YOU TO PAY FOR THE VERY BASICS LIKE FOOD, HOUSING, MEDICAL CARE, AND HEATING?: NOT HARD AT ALL

## 2023-08-03 SDOH — ECONOMIC STABILITY: FOOD INSECURITY: WITHIN THE PAST 12 MONTHS, YOU WORRIED THAT YOUR FOOD WOULD RUN OUT BEFORE YOU GOT MONEY TO BUY MORE.: NEVER TRUE

## 2023-08-03 ASSESSMENT — PATIENT HEALTH QUESTIONNAIRE - PHQ9
SUM OF ALL RESPONSES TO PHQ QUESTIONS 1-9: 0
SUM OF ALL RESPONSES TO PHQ9 QUESTIONS 1 & 2: 0
1. LITTLE INTEREST OR PLEASURE IN DOING THINGS: 0
SUM OF ALL RESPONSES TO PHQ QUESTIONS 1-9: 0
SUM OF ALL RESPONSES TO PHQ QUESTIONS 1-9: 0
2. FEELING DOWN, DEPRESSED OR HOPELESS: 0
SUM OF ALL RESPONSES TO PHQ QUESTIONS 1-9: 0

## 2023-08-03 ASSESSMENT — ANXIETY QUESTIONNAIRES
5. BEING SO RESTLESS THAT IT IS HARD TO SIT STILL: 0
4. TROUBLE RELAXING: 0
2. NOT BEING ABLE TO STOP OR CONTROL WORRYING: 0
3. WORRYING TOO MUCH ABOUT DIFFERENT THINGS: 0
GAD7 TOTAL SCORE: 0
7. FEELING AFRAID AS IF SOMETHING AWFUL MIGHT HAPPEN: 0
6. BECOMING EASILY ANNOYED OR IRRITABLE: 0
IF YOU CHECKED OFF ANY PROBLEMS ON THIS QUESTIONNAIRE, HOW DIFFICULT HAVE THESE PROBLEMS MADE IT FOR YOU TO DO YOUR WORK, TAKE CARE OF THINGS AT HOME, OR GET ALONG WITH OTHER PEOPLE: NOT DIFFICULT AT ALL
1. FEELING NERVOUS, ANXIOUS, OR ON EDGE: 0

## 2023-08-08 LAB
GAMMA INTERFERON BACKGROUND BLD IA-ACNC: 0.08 IU/ML
M TB IFN-G BLD-IMP: NEGATIVE
M TB IFN-G CD4+ T-CELLS BLD-ACNC: 0.04 IU/ML
M TBIFN-G CD4+ CD8+T-CELLS BLD-ACNC: 0.04 IU/ML
MITOGEN IGNF BLD-ACNC: >10 IU/ML
SERVICE CMNT-IMP: NORMAL

## (undated) DEVICE — ELECTRODE,RADIOTRANSLUCENT,FOAM,3PK: Brand: MEDLINE

## (undated) DEVICE — DRAPE,REIN 53X77,STERILE: Brand: MEDLINE

## (undated) DEVICE — 3M™ CUROS™ DISINFECTING CAP FOR NEEDLELESS CONNECTORS 270/CARTON 20 CARTONS/CASE CFF1-270: Brand: CUROS™

## (undated) DEVICE — CANN NASAL O2 CAPNOGRAPHY AD -- FILTERLINE

## (undated) DEVICE — STERILE POLYISOPRENE POWDER-FREE SURGICAL GLOVES WITH EMOLLIENT COATING: Brand: PROTEXIS

## (undated) DEVICE — PREP SKN CHLRAPRP APL 26ML STR --

## (undated) DEVICE — BAG SPEC BIOHZRD 10 X 10 IN --

## (undated) DEVICE — STERILE POLYISOPRENE POWDER-FREE SURGICAL GLOVES: Brand: PROTEXIS

## (undated) DEVICE — INSUFFLATION NEEDLE: Brand: SURGINEEDLE

## (undated) DEVICE — DERMABOND SKIN ADH 0.7ML -- DERMABOND ADVANCED 12/BX

## (undated) DEVICE — STRAP,POSITIONING,KNEE/BODY,FOAM,4X60": Brand: MEDLINE

## (undated) DEVICE — TROCAR: Brand: KII SLEEVE

## (undated) DEVICE — TOTAL TRAY, 16FR 10ML SIL FOLEY, URN: Brand: MEDLINE

## (undated) DEVICE — TRAY PREP DRY W/ PREM GLV 2 APPL 6 SPNG 2 UNDPD 1 OVERWRAP

## (undated) DEVICE — PAD,SANITARY,11 IN,MAXI,N-STRL,IND WRAP: Brand: MEDLINE

## (undated) DEVICE — FORCEPS BX L240CM JAW DIA2.8MM L CAP W/ NDL MIC MESH TOOTH

## (undated) DEVICE — SOL IRR SOD CL 0.9% 1000ML BTL --

## (undated) DEVICE — SOLIDIFIER MEDC 1200ML -- CONVERT TO 356117

## (undated) DEVICE — SET GRAV CK VLV NEEDLESS ST 3 GANGED 4WAY STPCOCK HI FLO 10

## (undated) DEVICE — TUBING FLTR PLUME AWAY EVAC W/ SUCT DEV DISP PUREVIEW

## (undated) DEVICE — 1200 GUARD II KIT W/5MM TUBE W/O VAC TUBE: Brand: GUARDIAN

## (undated) DEVICE — KIT COLON W/ 1.1OZ LUB AND 2 END

## (undated) DEVICE — Device

## (undated) DEVICE — SURGICAL PROCEDURE PACK GYN LAPAROSCOPY CUST SMH LF

## (undated) DEVICE — SUTURE MCRYL SZ 4-0 L27IN ABSRB UD L19MM PS-2 1/2 CIR PRIM Y426H

## (undated) DEVICE — SCISSORS ENDOSCP DIA5MM CRV MPLR CAUT W/ RATCH HNDL

## (undated) DEVICE — BAG BELONG PT PERS CLEAR HANDL

## (undated) DEVICE — CATH IV AUTOGRD BC BLU 22GA 25 -- INSYTE

## (undated) DEVICE — (D)SENSOR RMFG 02 PULS OXMTR -- DISC BY MFR USE ITEM 133445

## (undated) DEVICE — BITEBLOCK ENDOSCP 60FR MAXI WHT POLYETH STURDY W/ VELC WVN

## (undated) DEVICE — REM POLYHESIVE ADULT PATIENT RETURN ELECTRODE: Brand: VALLEYLAB

## (undated) DEVICE — TROCAR: Brand: KII OPTICAL ACCESS SYSTEM

## (undated) DEVICE — CUFF RMFG BP INF SZ 11 DISP -- LAWSON OEM ITEM 238915

## (undated) DEVICE — INFECTION CONTROL KIT SYS

## (undated) DEVICE — CONTAINER SPEC 20 ML LID NEUT BUFF FORMALIN 10 % POLYPR STS

## (undated) DEVICE — PAD POSITIONING WNG STD KIT W/BODY STRP LF DISP